# Patient Record
Sex: MALE | Race: WHITE | NOT HISPANIC OR LATINO | Employment: OTHER | URBAN - METROPOLITAN AREA
[De-identification: names, ages, dates, MRNs, and addresses within clinical notes are randomized per-mention and may not be internally consistent; named-entity substitution may affect disease eponyms.]

---

## 2017-01-04 ENCOUNTER — LAB VISIT (OUTPATIENT)
Dept: LAB | Facility: HOSPITAL | Age: 82
End: 2017-01-04
Attending: INTERNAL MEDICINE
Payer: MEDICARE

## 2017-01-04 DIAGNOSIS — M54.9 BACK PAIN, UNSPECIFIED BACK LOCATION, UNSPECIFIED BACK PAIN LATERALITY, UNSPECIFIED CHRONICITY: ICD-10-CM

## 2017-01-04 LAB
ALBUMIN SERPL BCP-MCNC: 3.6 G/DL
ALP SERPL-CCNC: 57 U/L
ALT SERPL W/O P-5'-P-CCNC: 27 U/L
ANION GAP SERPL CALC-SCNC: 6 MMOL/L
AST SERPL-CCNC: 25 U/L
BASOPHILS # BLD AUTO: 0.03 K/UL
BASOPHILS NFR BLD: 0.4 %
BILIRUB SERPL-MCNC: 0.9 MG/DL
BUN SERPL-MCNC: 22 MG/DL
CALCIUM SERPL-MCNC: 9 MG/DL
CHLORIDE SERPL-SCNC: 107 MMOL/L
CO2 SERPL-SCNC: 26 MMOL/L
CREAT SERPL-MCNC: 1 MG/DL
DIFFERENTIAL METHOD: ABNORMAL
EOSINOPHIL # BLD AUTO: 0.1 K/UL
EOSINOPHIL NFR BLD: 1.4 %
ERYTHROCYTE [DISTWIDTH] IN BLOOD BY AUTOMATED COUNT: 14.2 %
EST. GFR  (AFRICAN AMERICAN): >60 ML/MIN/1.73 M^2
EST. GFR  (NON AFRICAN AMERICAN): >60 ML/MIN/1.73 M^2
GLUCOSE SERPL-MCNC: 95 MG/DL
HCT VFR BLD AUTO: 48.4 %
HGB BLD-MCNC: 16.6 G/DL
LYMPHOCYTES # BLD AUTO: 1.6 K/UL
LYMPHOCYTES NFR BLD: 21.9 %
MCH RBC QN AUTO: 30.1 PG
MCHC RBC AUTO-ENTMCNC: 34.3 %
MCV RBC AUTO: 88 FL
MONOCYTES # BLD AUTO: 0.8 K/UL
MONOCYTES NFR BLD: 10.2 %
NEUTROPHILS # BLD AUTO: 4.9 K/UL
NEUTROPHILS NFR BLD: 66.1 %
PLATELET # BLD AUTO: 146 K/UL
PMV BLD AUTO: 10.7 FL
POTASSIUM SERPL-SCNC: 5.1 MMOL/L
PROT SERPL-MCNC: 6.3 G/DL
RBC # BLD AUTO: 5.51 M/UL
SODIUM SERPL-SCNC: 139 MMOL/L
WBC # BLD AUTO: 7.35 K/UL

## 2017-01-04 PROCEDURE — 80053 COMPREHEN METABOLIC PANEL: CPT

## 2017-01-04 PROCEDURE — 85025 COMPLETE CBC W/AUTO DIFF WBC: CPT

## 2017-01-04 PROCEDURE — 36415 COLL VENOUS BLD VENIPUNCTURE: CPT

## 2017-01-09 ENCOUNTER — OFFICE VISIT (OUTPATIENT)
Dept: INTERNAL MEDICINE | Facility: CLINIC | Age: 82
End: 2017-01-09
Payer: MEDICARE

## 2017-01-09 VITALS
BODY MASS INDEX: 27.93 KG/M2 | SYSTOLIC BLOOD PRESSURE: 114 MMHG | DIASTOLIC BLOOD PRESSURE: 73 MMHG | HEIGHT: 67 IN | RESPIRATION RATE: 16 BRPM | HEART RATE: 70 BPM | WEIGHT: 177.94 LBS

## 2017-01-09 DIAGNOSIS — M17.0 PRIMARY OSTEOARTHRITIS OF BOTH KNEES: Primary | ICD-10-CM

## 2017-01-09 DIAGNOSIS — Z96.659 STATUS POST TOTAL KNEE REPLACEMENT, UNSPECIFIED LATERALITY: ICD-10-CM

## 2017-01-09 DIAGNOSIS — N40.0 BENIGN NON-NODULAR PROSTATIC HYPERPLASIA WITHOUT LOWER URINARY TRACT SYMPTOMS: ICD-10-CM

## 2017-01-09 DIAGNOSIS — N20.0 CALCIUM OXALATE RENAL STONES: ICD-10-CM

## 2017-01-09 DIAGNOSIS — K63.5 POLYP OF COLON, UNSPECIFIED PART OF COLON, UNSPECIFIED TYPE: ICD-10-CM

## 2017-01-09 PROCEDURE — 1157F ADVNC CARE PLAN IN RCRD: CPT | Mod: S$GLB,,, | Performed by: INTERNAL MEDICINE

## 2017-01-09 PROCEDURE — 1160F RVW MEDS BY RX/DR IN RCRD: CPT | Mod: S$GLB,,, | Performed by: INTERNAL MEDICINE

## 2017-01-09 PROCEDURE — 99999 PR PBB SHADOW E&M-EST. PATIENT-LVL III: CPT | Mod: PBBFAC,,, | Performed by: INTERNAL MEDICINE

## 2017-01-09 PROCEDURE — 1159F MED LIST DOCD IN RCRD: CPT | Mod: S$GLB,,, | Performed by: INTERNAL MEDICINE

## 2017-01-09 PROCEDURE — 99214 OFFICE O/P EST MOD 30 MIN: CPT | Mod: S$GLB,,, | Performed by: INTERNAL MEDICINE

## 2017-01-09 NOTE — PROGRESS NOTES
He is a 83-year-old gentleman coming in today for a physical exam and   followup of his ongoing medical problems.     He has a history of renal   stones. He had a lithotripsy and renal stents in the distant past. He has seen Urology for this. He has BPH, but can't tolerate flomax. He is having to urinate 2-3 times at night. . He is urinating well the rest of the time. He saw Urology last in June- abdominal XRAY- wnl.     He has had bilateral knee replacement in 2013 and is doing well.    colon polyps- he had a c-scope 4/11-and a repeat in 4/2014-- he had 4 polyps on the last c-scope and is due back in 4/2017. No Gi complaint. No GI complinats       SOCIAL HISTORY: He drinks beer occasionally. He is . He quit   smoking 30 years ago.         ROS : Gen - no fatigue or significant weight change  Eyes - no eye pain or visual changes  ENT - no hoarseness or sore throat.   CV - no CP or SOB  Pulm - no cough or wheezing  GI - no N/V/D   no dysuria or incontinence  MS - as above  Skin - no rash, or c/o of skin lesions  Neuro - no HA, dizziness. memory is good.   Heme - no abnormal bleeding or bruising  Endo - no polydipsia, or temperature changes  Psych - no anxiety or depression         PHYSICAL EXAM: He is a well-appearing 83-year-old gentleman in no acute   distress. wt 177#, bp 114/73, p 64   NECK: Supple. No JVD. Thyroid is not enlarged.   Nodes: No cervical , axillary , or inginual adenopathy.   CHest: His chest is clear and without wheeze.   CARDIOVASCULAR: S1, S2. Regular rate and rhythm without murmur, gallop, or   rub.   ABDOMEN: Soft, nontender. No hepatosplenomegaly. No guarding or rebound   tenderness.   LOWER EXTREMITIES: He has bilateral arthritic changes of the knees. Left   lower extremity has no edema. Right lower extremity, no edema. Dorsal   pedis pulses are normal. Scars haling well.   He has no cervical, axillary, or inguinal adenopathy.   He has bilateral biceps tears and muscle is more  distal- good strength Normal triceps, and patellar deep tendon reflexes.     ASSESSMENT:   1. S/p bilateral knee replacements   2. H/o Renal stones.   3.  BPH-- elevated PSA - follow with urology- PSA pending .   4.History of colon polyps.   5. 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     RECOMMENDATIONS:   1. I recommend low-fat/low-cholesterol diet and exercise.   2. Had Prevnar 13 in 12/2015-- needs tdap today

## 2017-01-09 NOTE — MR AVS SNAPSHOT
[facility]  [address]  [facility] [address]  Phone: [phone]  Fax: [phone]                  Negro Bhakta   2017 9:40 AM   Office Visit    Description:  Male : 9/10/1933   Provider:  Darwin Byrne Jr., MD   Department:  [facility] Medicine           Reason for Visit     Annual Exam           Diagnoses this Visit        Comments    Primary osteoarthritis of both knees    -  Primary     Calcium oxalate renal stones         Benign non-nodular prostatic hyperplasia without lower urinary tract symptoms         Polyp of colon, unspecified part of colon, unspecified type         Status post total knee replacement, unspecified laterality                To Do List           Future Appointments        Provider Department Dept Phone    2/3/2017 3:00 PM Shalom Mcfadden MD [place] - [facility] [phone]      Goals (5 Years of Data)     None      [facility]     [facility] -  Assistance  Registered nurses in the [facility] provide clinical advisement, health education, appointment booking, and other advisory services.  Call for this free service at [phone].             Medications           Message regarding Medications     Verify the changes and/or additions to your medication regime listed below are the same as discussed with your clinician today.  If any of these changes or additions are incorrect, please notify your healthcare provider.             Verify that the below list of medications is an accurate representation of the medications you are currently taking.  If none reported, the list may be blank. If incorrect, please contact your healthcare provider. Carry this list with you in case of emergency.           Current Medications     diclofenac sodium (VOLTAREN) 1 % Gel Apply 2 g topically 4 (four) times daily as needed.    latanoprost 0.005 % ophthalmic solution Place 1 drop into the right eye every  "evening.    multivitamin (ONE DAILY MULTIVITAMIN) per tablet Take 1 tablet by mouth once daily.           Clinical Reference Information           Vital Signs - Last Recorded  Most recent update: 1/9/2017  9:33 AM by Chuck Vazquez MA    BP Pulse Resp Ht Wt BMI    114/73 (BP Location: Right arm, Patient Position: Sitting) 70 16 5' 7" (1.702 m) 80.7 kg (177 lb 14.6 oz) 27.86 kg/m2      Blood Pressure          Most Recent Value    BP  114/73      Allergies as of 1/9/2017     Amoxicillin      Immunizations Administered on Date of Encounter - 1/9/2017     None      "

## 2017-02-03 ENCOUNTER — OFFICE VISIT (OUTPATIENT)
Dept: PAIN MEDICINE | Facility: CLINIC | Age: 82
End: 2017-02-03
Payer: MEDICARE

## 2017-02-03 VITALS
BODY MASS INDEX: 25 KG/M2 | DIASTOLIC BLOOD PRESSURE: 82 MMHG | WEIGHT: 178.56 LBS | RESPIRATION RATE: 16 BRPM | HEIGHT: 71 IN | HEART RATE: 72 BPM | SYSTOLIC BLOOD PRESSURE: 148 MMHG

## 2017-02-03 DIAGNOSIS — M51.36 DDD (DEGENERATIVE DISC DISEASE), LUMBAR: ICD-10-CM

## 2017-02-03 DIAGNOSIS — M75.81 ROTATOR CUFF TENDINITIS, RIGHT: ICD-10-CM

## 2017-02-03 DIAGNOSIS — M19.019 GLENOHUMERAL ARTHRITIS, UNSPECIFIED LATERALITY: Primary | ICD-10-CM

## 2017-02-03 PROCEDURE — 99214 OFFICE O/P EST MOD 30 MIN: CPT | Mod: S$GLB,,, | Performed by: ANESTHESIOLOGY

## 2017-02-03 PROCEDURE — 99999 PR PBB SHADOW E&M-EST. PATIENT-LVL III: CPT | Mod: PBBFAC,,, | Performed by: ANESTHESIOLOGY

## 2017-02-03 PROCEDURE — 1159F MED LIST DOCD IN RCRD: CPT | Mod: S$GLB,,, | Performed by: ANESTHESIOLOGY

## 2017-02-03 PROCEDURE — 1160F RVW MEDS BY RX/DR IN RCRD: CPT | Mod: S$GLB,,, | Performed by: ANESTHESIOLOGY

## 2017-02-03 PROCEDURE — 1157F ADVNC CARE PLAN IN RCRD: CPT | Mod: S$GLB,,, | Performed by: ANESTHESIOLOGY

## 2017-02-03 PROCEDURE — 1125F AMNT PAIN NOTED PAIN PRSNT: CPT | Mod: S$GLB,,, | Performed by: ANESTHESIOLOGY

## 2017-02-03 NOTE — PROGRESS NOTES
Chronic patient Established Note (Follow up visit)      SUBJECTIVE:    Negro Bhakta presents to the clinic for a follow-up appointment for ***. Pt is here for s/p bilateral shoulder intra-articular injection on 12/23/17 with ***  Since the last visit, Negro Bhakta states the pain has been {PAIN - I/W/P:84534}. Current pain intensity is {GEN PAIN SCALE HI:04282}.    Pain Disability Index Review:  No flowsheet data found.    Pain Medications:    {PMC - MEDICATIONS:82600}    Opioid Contract: {YES/NO:63}     report:  {:85398}    Pain Procedures: 12/23/17 bilateral shoulder intra-articular injection     Physical Therapy/Home Exercise: {YES/NO:63}    Imaging: ***    Allergies:   Review of patient's allergies indicates:   Allergen Reactions    Amoxicillin Other (See Comments)     Blisters to hands       Current Medications:   Current Outpatient Prescriptions   Medication Sig Dispense Refill    diclofenac sodium (VOLTAREN) 1 % Gel Apply 2 g topically 4 (four) times daily as needed. 100 g 5    latanoprost 0.005 % ophthalmic solution Place 1 drop into the right eye every evening. 7.5 mL 5    multivitamin (ONE DAILY MULTIVITAMIN) per tablet Take 1 tablet by mouth once daily.      [DISCONTINUED] warfarin (COUMADIN) 2.5 MG tablet Take 1 tablet (2.5 mg total) by mouth Daily. 60 tablet 0     No current facility-administered medications for this visit.        REVIEW OF SYSTEMS:    GENERAL:  No weight loss, malaise or fevers.  HEENT:  Negative for frequent or significant headaches.  NECK:  Negative for lumps, goiter, pain and significant neck swelling.  RESPIRATORY:  Negative for cough, wheezing or shortness of breath.  CARDIOVASCULAR:  Negative for chest pain, leg swelling or palpitations.  GI:  Negative for abdominal discomfort, blood in stools or black stools or change in bowel habits.  MUSCULOSKELETAL:  See HPI.  SKIN:  Negative for lesions, rash, and itching.  PSYCH:  Negative for sleep  disturbance, mood disorder and recent psychosocial stressors.  HEMATOLOGY/LYMPHOLOGY:  Negative for prolonged bleeding, bruising easily or swollen nodes.  NEURO:   No history of headaches, syncope, paralysis, seizures or tremors.  All other reviewed and negative other than HPI.    Past Medical History:  Past Medical History   Diagnosis Date    Allergy     Arthritis     Baker's cyst     BPH (benign prostatic hypertrophy)     Calcium oxalate renal stones     Cataract     Elevated PSA     Glaucoma        Past Surgical History:  Past Surgical History   Procedure Laterality Date    Extracorporeal shock wave lithotripsy      Total knee arthroplasty  7/10/2013     Left    Total knee arthroplasty  3/13/2013     Right    Joint replacement Bilateral     Tonsillectomy      Adenoidectomy         Family History:  Family History   Problem Relation Age of Onset    Cataracts Father     Cancer Sister      Retinal cancer    Esophageal cancer Mother       at 49 years and in the year     Cancer Mother      stomach    No Known Problems Brother     No Known Problems Daughter     No Known Problems Son     No Known Problems Son     No Known Problems Son     No Known Problems Son     No Known Problems Son     No Known Problems Maternal Aunt     No Known Problems Maternal Uncle     No Known Problems Paternal Aunt     No Known Problems Paternal Uncle     No Known Problems Maternal Grandmother     No Known Problems Maternal Grandfather     No Known Problems Paternal Grandmother     No Known Problems Paternal Grandfather     Amblyopia Neg Hx     Blindness Neg Hx     Diabetes Neg Hx     Glaucoma Neg Hx     Hypertension Neg Hx     Macular degeneration Neg Hx     Retinal detachment Neg Hx     Strabismus Neg Hx     Stroke Neg Hx     Thyroid disease Neg Hx        Social History:  Social History     Social History    Marital status:      Spouse name: Delmis    Number of children: N/A     Years of education: N/A     Social History Main Topics    Smoking status: Former Smoker     Quit date: 12/7/1975    Smokeless tobacco: Never Used    Alcohol use 0.6 oz/week     1 Cans of beer per week    Drug use: No    Sexual activity: Yes     Partners: Female     Other Topics Concern    Not on file     Social History Narrative       OBJECTIVE:    There were no vitals taken for this visit.    PHYSICAL EXAMINATION:    General appearance: Well appearing, in no acute distress, alert and oriented x3.  Psych:  Mood and affect appropriate.  Skin: Skin color, texture, turgor normal, no rashes or lesions, in both upper and lower body.  Head/face:  Atraumatic, normocephalic. No palpable lymph nodes  Neck: No pain to palpation over the cervical paraspinous muscles. Spurling Negative. No pain with neck flexion, extension, or lateral flexion. .  Cor: RRR  Pulm: CTA  GI: Abdomen soft and non-tender.  Back: Straight leg raising in the sitting and supine positions is negative to radicular pain. No pain to palpation over the spine or costovertebral angles. Normal range of motion without pain reproduction.  Extremities: Peripheral joint ROM is full and pain free without obvious instability or laxity in all four extremities. No deformities, edema, or skin discoloration. Good capillary refill.  Musculoskeletal: Shoulder, hip, sacroiliac and knee provocative maneuvers are negative. Bilateral upper and lower extremity strength is normal and symmetric.  No atrophy or tone abnormalities are noted.  Neuro: Bilateral upper and lower extremity coordination and muscle stretch reflexes are physiologic and symmetric.  Plantar response are downgoing. No loss of sensation is noted.  Gait: Normal.    ASSESSMENT: 83 y.o. year old male with *** pain, consistent with ***     No diagnosis found.      PLAN:     {PLAN:93954}  - RTC ***  - Counseled patient regarding the importance of {:56278}.    The above plan and management options were  discussed at length with patient. Patient is in agreement with the above and verbalized understanding.    Aurora Florez  02/03/2017

## 2017-02-03 NOTE — PROGRESS NOTES
Chronic patient Established Note (Follow up visit)      SUBJECTIVE:    Negro Bhakta presents to the clinic for a follow-up appointment for shoulder pain bilaterally.     Pain Disability Index Review:  Last 3 PDI Scores 2/3/2017   Pain Disability Index (PDI) 24       Pain Medications:    - Opioids: none   Other - see medication card  Opioid Contract: no     report:  Reviewed and consistent with medication use as prescribed.    Pain Procedures: 12/23/17 bilateral shoulder intra-articular injection with Dr Fely Haney.  Physical Therapy/Home Exercise: yes at home    Imaging:none    Allergies:   Review of patient's allergies indicates:   Allergen Reactions    Amoxicillin Other (See Comments)     Blisters to hands       Current Medications:   Current Outpatient Prescriptions   Medication Sig Dispense Refill    diclofenac sodium (VOLTAREN) 1 % Gel Apply 2 g topically 4 (four) times daily as needed. 100 g 5    latanoprost 0.005 % ophthalmic solution Place 1 drop into the right eye every evening. 7.5 mL 5    multivitamin (ONE DAILY MULTIVITAMIN) per tablet Take 1 tablet by mouth once daily.      [DISCONTINUED] warfarin (COUMADIN) 2.5 MG tablet Take 1 tablet (2.5 mg total) by mouth Daily. 60 tablet 0     No current facility-administered medications for this visit.        REVIEW OF SYSTEMS:    GENERAL:  No weight loss, malaise or fevers.  HEENT:  Negative for frequent or significant headaches.  NECK:  Negative for lumps, goiter, pain and significant neck swelling.  RESPIRATORY:  Negative for cough, wheezing or shortness of breath.  CARDIOVASCULAR:  Negative for chest pain, leg swelling or palpitations.  GI:  Negative for abdominal discomfort, blood in stools or black stools or change in bowel habits.  MUSCULOSKELETAL:  See HPI., + low back and bilateral hand pain   SKIN:  Negative for lesions, rash, and itching.  PSYCH:  Negative for sleep disturbance, mood disorder and recent psychosocial  stressors.  HEMATOLOGY/LYMPHOLOGY:  Negative for prolonged bleeding, bruising easily or swollen nodes.  NEURO:   No history of headaches, syncope, paralysis, seizures or tremors.  All other reviewed and negative other than HPI.    Past Medical History:  Past Medical History   Diagnosis Date    Allergy     Arthritis     Baker's cyst     BPH (benign prostatic hypertrophy)     Calcium oxalate renal stones     Cataract     Elevated PSA     Glaucoma        Past Surgical History:  Past Surgical History   Procedure Laterality Date    Extracorporeal shock wave lithotripsy      Total knee arthroplasty  7/10/2013     Left    Total knee arthroplasty  3/13/2013     Right    Joint replacement Bilateral     Tonsillectomy      Adenoidectomy         Family History:  Family History   Problem Relation Age of Onset    Cataracts Father     Cancer Sister      Retinal cancer    Esophageal cancer Mother       at 49 years and in the year 195    Cancer Mother      stomach    No Known Problems Brother     No Known Problems Daughter     No Known Problems Son     No Known Problems Son     No Known Problems Son     No Known Problems Son     No Known Problems Son     No Known Problems Maternal Aunt     No Known Problems Maternal Uncle     No Known Problems Paternal Aunt     No Known Problems Paternal Uncle     No Known Problems Maternal Grandmother     No Known Problems Maternal Grandfather     No Known Problems Paternal Grandmother     No Known Problems Paternal Grandfather     Amblyopia Neg Hx     Blindness Neg Hx     Diabetes Neg Hx     Glaucoma Neg Hx     Hypertension Neg Hx     Macular degeneration Neg Hx     Retinal detachment Neg Hx     Strabismus Neg Hx     Stroke Neg Hx     Thyroid disease Neg Hx        Social History:  Social History     Social History    Marital status:      Spouse name: Delmis    Number of children: N/A    Years of education: N/A     Social History Main  "Topics    Smoking status: Former Smoker     Quit date: 12/7/1975    Smokeless tobacco: Never Used    Alcohol use 0.6 oz/week     1 Cans of beer per week    Drug use: No    Sexual activity: Yes     Partners: Female     Other Topics Concern    Not on file     Social History Narrative       OBJECTIVE:    Visit Vitals    BP (!) 148/82 (BP Location: Left arm, Patient Position: Sitting, BP Method: Manual)    Pulse 72    Resp 16    Ht 5' 11" (1.803 m)    Wt 81 kg (178 lb 9.2 oz)    BMI 24.91 kg/m2       PHYSICAL EXAMINATION:    General appearance: Well appearing, in no acute distress, alert and oriented x3.  Psych:  Mood and affect appropriate.  Skin: Skin color, texture, turgor normal, no rashes or lesions, in both upper and lower body.  Head/face:  Atraumatic, normocephalic. No palpable lymph nodes  Neck: No pain to palpation over the cervical paraspinous muscles. Spurling Negative. No pain with neck flexion, extension, or lateral flexion. .  Back: Straight leg raising in the sitting and supine positions is negative to radicular pain. No pain to palpation over the spine or costovertebral angles. Normal range of motion without pain reproduction.  Extremities Decrease ROM of the right Shoulder .+ inverted can test on the right  Musculoskeletal: Shoulder, hip, sacroiliac and knee provocative maneuvers are negative. Bilateral upper and lower extremity strength is normal and symmetric.  No atrophy or tone abnormalities are noted.  Neuro: Bilateral upper and lower extremity coordination and muscle stretch reflexes are physiologic and symmetric 2+ in UES 1+ in LEs.  Plantar response are downgoing. No loss of sensation is noted.  Gait: Normal.    ASSESSMENT: 83 y.o. year old male with right > left shoduelr pain ass well as bilaetral hand and lwo back  pain, consistent with GH arthritis rotator cuff tendinitis and lumbar DDD   He is here s/p bilateral shoulder intra-articular injection by  on 12/23/17 with " left is 95% relief and right with 25% relief.He is doing PT and HEP and using Voltaren gel topicaly   He is a new pt  to me    1. Glenohumeral arthritis, unspecified laterality    2. Rotator cuff tendinitis, right    3. DDD (degenerative disc disease), lumbar          PLAN:     - I have stressed the importance of physical activity and a home exercise plan to help with pain and improve health.  -Can repeat right US guided subacromial bursa injection in 2 months  - RTC in 2 months for shoulder injections  - Counseled patient regarding the importance of physical therapy.    The above plan and management options were discussed at length with patient. Patient is in agreement with the above and verbalized understanding.  Shalom Mcfadden MD  2/3/2017

## 2017-04-07 ENCOUNTER — HOSPITAL ENCOUNTER (OUTPATIENT)
Dept: RADIOLOGY | Facility: HOSPITAL | Age: 82
Discharge: HOME | End: 2017-04-07
Attending: ANESTHESIOLOGY
Payer: MEDICARE

## 2017-04-07 ENCOUNTER — PROCEDURE VISIT (OUTPATIENT)
Dept: PAIN MEDICINE | Facility: CLINIC | Age: 82
End: 2017-04-07
Payer: MEDICARE

## 2017-04-07 VITALS
BODY MASS INDEX: 24.61 KG/M2 | SYSTOLIC BLOOD PRESSURE: 124 MMHG | HEIGHT: 71 IN | RESPIRATION RATE: 16 BRPM | DIASTOLIC BLOOD PRESSURE: 74 MMHG | HEART RATE: 68 BPM | WEIGHT: 175.81 LBS

## 2017-04-07 DIAGNOSIS — M75.50 SUBACROMIAL BURSITIS: Primary | ICD-10-CM

## 2017-04-07 DIAGNOSIS — M75.50 SUBACROMIAL BURSITIS: ICD-10-CM

## 2017-04-07 PROCEDURE — 73030 X-RAY EXAM OF SHOULDER: CPT | Mod: 26,50,, | Performed by: RADIOLOGY

## 2017-04-07 PROCEDURE — 73030 X-RAY EXAM OF SHOULDER: CPT | Mod: TC,50

## 2017-04-07 PROCEDURE — 20611 DRAIN/INJ JOINT/BURSA W/US: CPT | Mod: S$GLB,,, | Performed by: ANESTHESIOLOGY

## 2017-04-07 RX ORDER — BUPIVACAINE HYDROCHLORIDE 2.5 MG/ML
5 INJECTION, SOLUTION EPIDURAL; INFILTRATION; INTRACAUDAL ONCE
Status: COMPLETED | OUTPATIENT
Start: 2017-04-07 | End: 2017-04-07

## 2017-04-07 RX ORDER — TRIAMCINOLONE ACETONIDE 40 MG/ML
40 INJECTION, SUSPENSION INTRA-ARTICULAR; INTRAMUSCULAR
Status: COMPLETED | OUTPATIENT
Start: 2017-04-07 | End: 2017-04-07

## 2017-04-07 RX ADMIN — TRIAMCINOLONE ACETONIDE 40 MG: 40 INJECTION, SUSPENSION INTRA-ARTICULAR; INTRAMUSCULAR at 04:04

## 2017-04-07 RX ADMIN — BUPIVACAINE HYDROCHLORIDE 12.5 MG: 2.5 INJECTION, SOLUTION EPIDURAL; INFILTRATION; INTRACAUDAL at 03:04

## 2017-04-07 NOTE — PROCEDURES
Procedures       INFORMED CONSENT: The procedure, risks, benefits and options were discussed with patient. There are no contraindications to the procedure. The patient expressed understanding and agreed to proceed. The personnel performing the procedure was discussed. I verify that I personally obtained Florence's consent prior to the start of the procedure and the signed consent can be found on the patient's chart.    PROCEDURE: LEFT US GUIDED SUBACROMIAL SHOULDER STEROID INJECTION    The patient in the sitting position.The area of the shoulder was prepped with chlorhexidine x 2. A 25 gauge 1.5  inch needle was slowly advanced into the LEFT subacromial  bursa of the left shoulder  Under  US guidance in plane approach .After negative aspiration 6 cc of a mixture of bupivacaine 0.25% and 40 mg Kenalog was easily injected with no resistance. Patient tolerated procedure well. No complications.    Shalom Mcfadden MD  4/7/2017

## 2017-05-02 ENCOUNTER — PATIENT MESSAGE (OUTPATIENT)
Dept: PAIN MEDICINE | Facility: CLINIC | Age: 82
End: 2017-05-02

## 2017-10-16 ENCOUNTER — OFFICE VISIT (OUTPATIENT)
Dept: OPTOMETRY | Facility: CLINIC | Age: 82
End: 2017-10-16
Payer: MEDICARE

## 2017-10-16 DIAGNOSIS — H25.13 NUCLEAR SCLEROSIS, BILATERAL: ICD-10-CM

## 2017-10-16 DIAGNOSIS — H40.053 OCULAR HYPERTENSION, BILATERAL: Primary | ICD-10-CM

## 2017-10-16 DIAGNOSIS — H26.9 CORTICAL CATARACT OF BOTH EYES: ICD-10-CM

## 2017-10-16 DIAGNOSIS — H52.4 PRESBYOPIA OF BOTH EYES: ICD-10-CM

## 2017-10-16 DIAGNOSIS — H52.223 REGULAR ASTIGMATISM OF BOTH EYES: ICD-10-CM

## 2017-10-16 PROCEDURE — 92015 DETERMINE REFRACTIVE STATE: CPT | Mod: S$GLB,,, | Performed by: OPTOMETRIST

## 2017-10-16 PROCEDURE — 99999 PR PBB SHADOW E&M-EST. PATIENT-LVL II: CPT | Mod: PBBFAC,,, | Performed by: OPTOMETRIST

## 2017-10-16 PROCEDURE — 92014 COMPRE OPH EXAM EST PT 1/>: CPT | Mod: S$GLB,,, | Performed by: OPTOMETRIST

## 2017-10-16 NOTE — PATIENT INSTRUCTIONS
Nuclear sclerotic/cortical cataract in both eyes.  No need for cataract surgery in either eye at this time.  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    IOP borderline in each eye today in OD (with Latanoprost 0.005% ophthalmic solution every evening) and in OS (without drops for IOP control).  Continue drops in OD for IOP control, as noted above.   Mrs Levy to call to schedule repeat HVF test (24-2 EUNICE Standard), and Spectralis OCT RNFL thickness analysis, and follow-up visit with me (Dr. Riggins) on the same date.     Regular astigmatism in each eye, with very satisfactory VA in each eye, and presbyopia consistent with age.   New spectacle lens Rx issued for use as desired.

## 2017-10-16 NOTE — PROGRESS NOTES
"HPI     Concerns About Ocular Health    Additional comments: Eye exam - general eye examination and refraction.  No acute ocular/vision problems            Comments   Patient's age: 84 y.o. WM  Occupation: Retired  Approximate date of last eye examination:  08/15/2016  Name of last eye doctor seen: Dr. Riggins  City/State: [facility]  Wears glasses? Yes     If yes, wears  Full-time or part-time?  Full-Time  Present glasses are: Bifocal, SV Distance, SV Reading?  Lined Bifocal  Approximate age of present glasses:  2015+  Got new glasses following last exam, or subsequently?:  No   Any problem with VA with glasses?  No  Wears CLs?:  No  Headaches?  No  Eye pain/discomfort?  No                                                                                     Flashes?  No  Floaters?  No  Diplopia/Double vision?  No  Patient's Ocular History:         Any eye surgeries? No         Any eye injury?  No         Any treatment for eye disease?  Ocular Hypertension  Family history of eye disease?  Sister with eye cancer and enucleation  Significant patient medical history:         1. Diabetes?  No   2. HBP?  No              3. Other (describe):     ! OTC eyedrops currently using:  No   ! Prescription eye meds currently using:  Latanoprost QHS OD Only    ! Any history of allergy/adverse reaction to any eye meds used   previously?  No   ! Any history of allergy/adverse reaction to eyedrops used during prior   eye exam(s)? No   ! Any history of allergy/adverse reaction to Novacaine or similar meds?   No   ! Any history of allergy/adverse reaction to Epinephrine or similar meds?   No    ! Patient okay with use of anesthetic eyedrops to check eye pressure?    Yes       ! Patient okay with use of eyedrops to dilate pupils today?  Yes   !  Allergies/Medications/Medical History/Family History reviewed today?    Yes      PD =   61/57  Desired reading distance =  18"                                                                       " Last edited by Yair Riggins, OD on 10/16/2017 10:59 AM. (History)            Assessment /Plan     For exam results, see Encounter Report.    1. Ocular hypertension, bilateral  Cruz Visual Field - OU - Extended - Both Eyes    Posterior Segment OCT Optic Nerve- Both eyes   2. Nuclear sclerosis, bilateral     3. Cortical cataract of both eyes     4. Regular astigmatism of both eyes     5. Presbyopia of both eyes                  Nuclear sclerotic/cortical cataract in both eyes.  No need for cataract surgery in either eye at this time.  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    IOP borderline in each eye today in OD (with Latanoprost 0.005% ophthalmic solution every evening) and in OS (without drops for IOP control).  Continue drops in OD for IOP control, as noted above.   Mrs Levy to call to schedule repeat HVF test (24-2 EUNICE Standard), and Spectralis OCT RNFL thickness analysis, and follow-up visit with me (Dr. Riggins) on the same date.     Regular astigmatism in each eye, with very satisfactory VA in each eye, and presbyopia consistent with age.   New spectacle lens Rx issued for use as desired.

## 2017-10-25 ENCOUNTER — CLINICAL SUPPORT (OUTPATIENT)
Dept: OPHTHALMOLOGY | Facility: CLINIC | Age: 82
End: 2017-10-25
Attending: OPTOMETRIST
Payer: MEDICARE

## 2017-10-25 ENCOUNTER — OFFICE VISIT (OUTPATIENT)
Dept: OPTOMETRY | Facility: CLINIC | Age: 82
End: 2017-10-25
Attending: OPTOMETRIST
Payer: MEDICARE

## 2017-10-25 DIAGNOSIS — H25.13 NUCLEAR SCLEROSIS, BILATERAL: ICD-10-CM

## 2017-10-25 DIAGNOSIS — H26.9 CORTICAL CATARACT OF BOTH EYES: ICD-10-CM

## 2017-10-25 DIAGNOSIS — H40.053 OCULAR HYPERTENSION, BILATERAL: ICD-10-CM

## 2017-10-25 DIAGNOSIS — H40.053 OCULAR HYPERTENSION, BILATERAL: Primary | ICD-10-CM

## 2017-10-25 DIAGNOSIS — H53.40 VISUAL FIELD DEFECT: ICD-10-CM

## 2017-10-25 PROCEDURE — 92133 CPTRZD OPH DX IMG PST SGM ON: CPT | Mod: S$GLB,,, | Performed by: OPTOMETRIST

## 2017-10-25 PROCEDURE — 92083 EXTENDED VISUAL FIELD XM: CPT | Mod: S$GLB,,, | Performed by: OPTOMETRIST

## 2017-10-25 PROCEDURE — 92012 INTRM OPH EXAM EST PATIENT: CPT | Mod: S$GLB,,, | Performed by: OPTOMETRIST

## 2017-10-25 PROCEDURE — 99999 PR PBB SHADOW E&M-EST. PATIENT-LVL II: CPT | Mod: PBBFAC,,, | Performed by: OPTOMETRIST

## 2017-10-25 NOTE — PATIENT INSTRUCTIONS
Prior diagnosis of ocular hypertension in the right eye.  Currently using Latanoprost 0.005% ophthalmic solution in the right eye (only) every evening for IOP control/reduction.  IOP today high-normal in the right eye (with drops), and borderline in the left eye (without drops).  Repeat HVF test (24-2 EUNICE Standard), and OCT RNFL thickness analysis done today.     OCT RNFL thickness analysis:       OD  Globally reduced - Borderline TI and T and TS - within normal range NS                            and N and NI         OS RNFL thickness within normal range 360 degrees     Reviewed results of repeat HVF test (24-2 EUNICE Standard) done today.  Refer to results in OIS.  Results appear reliable x 3, OD and OS.       OD  GHT Outside Normal Limits             Pattrern Deviation:  Focal superior paracentral visual field defect, and inferior paracentral defects suggestive of early inferior arcuate defect             GPA:  Possible Progression        OS  GHT Within Normal Range              Pattern Deviation:  Normal               GPA:  No Progression Detected    Discussed results with Mr. Bhakta  Recheck in six months, with repeat HVF test.    No change to drops for IOP control made today.  Consider modification of treatment for IOP control/reduction if results of future HVF test consistent with results noted today.     Orders for future HVF test entered.

## 2017-10-26 NOTE — PROGRESS NOTES
HPI     Concerns About Ocular Health    Additional comments: f/u            Comments   Patient in for progress check.    Being followed for (diagnosis):   Ocular Hypertension     Date last seen:  10/16/17    Doctor last seen:  Dr. Riggins     Prescribed eye medications(s) using:  Latanoprost 0.005% OD     OTC eye medication(s) using:  None     Signs/symptoms of condition resolved/better/stable/worse?:  Stable     Allergies/Medications reviewed today?  Yes              Last edited by Rao Levy on 10/25/2017 12:53 PM. (History)            Assessment /Plan     For exam results, see Encounter Report.    1. Ocular hypertension, bilateral  Posterior Segment OCT Optic Nerve- Both eyes    Cruz Visual Field - OU - Extended - Both Eyes    CANCELED: Posterior Segment OCT Retina-Both eyes   2. Visual field defect  Cruz Visual Field - OU - Extended - Both Eyes   3. Nuclear sclerosis, bilateral     4. Cortical cataract of both eyes                      Prior diagnosis of ocular hypertension in the right eye.  Currently using Latanoprost 0.005% ophthalmic solution in the right eye (only) every evening for IOP control/reduction.  IOP today high-normal in the right eye (with drops), and borderline in the left eye (without drops).  Repeat HVF test (24-2 EUNICE Standard), and OCT RNFL thickness analysis done today.     OCT RNFL thickness analysis:       OD  Globally reduced - Borderline TI and T and TS - within normal range NS                            and N and NI         OS RNFL thickness within normal range 360 degrees     Reviewed results of repeat HVF test (24-2 EUNICE Standard) done today.  Refer to results in OIS.  Results appear reliable x 3, OD and OS.       OD  GHT Outside Normal Limits             Pattrern Deviation:  Focal superior paracentral visual field defect, and inferior paracentral defects suggestive of early inferior arcuate defect             GPA:  Possible Progression        OS  GHT Within Normal  Range              Pattern Deviation:  Normal               GPA:  No Progression Detected    Discussed results with Mr. Bhakta  Recheck in six months, with repeat HVF test.    No change to drops for IOP control made today.  Consider modification of treatment for IOP control/reduction if results of future HVF test consistent with results noted today.   Orders for future HVF test entered.

## 2017-11-27 ENCOUNTER — TELEPHONE (OUTPATIENT)
Dept: INTERNAL MEDICINE | Facility: CLINIC | Age: 82
End: 2017-11-27

## 2017-11-27 DIAGNOSIS — N20.0 CALCIUM OXALATE RENAL STONES: Primary | ICD-10-CM

## 2017-11-27 NOTE — TELEPHONE ENCOUNTER
----- Message from Balwinder Ramos sent at 11/27/2017  3:07 PM CST -----  Contact: Patient [phone]  The Annual Physical appointment is set for 2/22/18. Need labs.    Thank you

## 2018-02-05 ENCOUNTER — PES CALL (OUTPATIENT)
Dept: ADMINISTRATIVE | Facility: CLINIC | Age: 83
End: 2018-02-05

## 2018-02-19 ENCOUNTER — LAB VISIT (OUTPATIENT)
Dept: LAB | Facility: HOSPITAL | Age: 83
End: 2018-02-19
Attending: INTERNAL MEDICINE
Payer: MEDICARE

## 2018-02-19 DIAGNOSIS — N20.0 CALCIUM OXALATE RENAL STONES: ICD-10-CM

## 2018-02-19 LAB
ALBUMIN SERPL BCP-MCNC: 3.8 G/DL
ALP SERPL-CCNC: 68 U/L
ALT SERPL W/O P-5'-P-CCNC: 26 U/L
ANION GAP SERPL CALC-SCNC: 6 MMOL/L
AST SERPL-CCNC: 28 U/L
BILIRUB SERPL-MCNC: 0.8 MG/DL
BUN SERPL-MCNC: 25 MG/DL
CALCIUM SERPL-MCNC: 9.6 MG/DL
CHLORIDE SERPL-SCNC: 105 MMOL/L
CO2 SERPL-SCNC: 28 MMOL/L
CREAT SERPL-MCNC: 1.2 MG/DL
EST. GFR  (AFRICAN AMERICAN): >60 ML/MIN/1.73 M^2
EST. GFR  (NON AFRICAN AMERICAN): 55 ML/MIN/1.73 M^2
GLUCOSE SERPL-MCNC: 98 MG/DL
POTASSIUM SERPL-SCNC: 5.2 MMOL/L
PROT SERPL-MCNC: 6.6 G/DL
SODIUM SERPL-SCNC: 139 MMOL/L

## 2018-02-19 PROCEDURE — 80053 COMPREHEN METABOLIC PANEL: CPT

## 2018-02-19 PROCEDURE — 36415 COLL VENOUS BLD VENIPUNCTURE: CPT

## 2018-02-20 ENCOUNTER — TELEPHONE (OUTPATIENT)
Dept: INTERNAL MEDICINE | Facility: CLINIC | Age: 83
End: 2018-02-20

## 2018-02-22 ENCOUNTER — OFFICE VISIT (OUTPATIENT)
Dept: INTERNAL MEDICINE | Facility: CLINIC | Age: 83
End: 2018-02-22
Payer: MEDICARE

## 2018-02-22 VITALS
SYSTOLIC BLOOD PRESSURE: 118 MMHG | DIASTOLIC BLOOD PRESSURE: 60 MMHG | HEIGHT: 71 IN | BODY MASS INDEX: 24.6 KG/M2 | HEART RATE: 60 BPM | WEIGHT: 175.69 LBS

## 2018-02-22 DIAGNOSIS — K63.5 POLYP OF COLON, UNSPECIFIED PART OF COLON, UNSPECIFIED TYPE: ICD-10-CM

## 2018-02-22 DIAGNOSIS — Z96.659 STATUS POST TOTAL KNEE REPLACEMENT, UNSPECIFIED LATERALITY: ICD-10-CM

## 2018-02-22 DIAGNOSIS — N20.0 CALCIUM OXALATE RENAL STONES: Primary | ICD-10-CM

## 2018-02-22 PROCEDURE — 1126F AMNT PAIN NOTED NONE PRSNT: CPT | Mod: S$GLB,,, | Performed by: INTERNAL MEDICINE

## 2018-02-22 PROCEDURE — 3008F BODY MASS INDEX DOCD: CPT | Mod: S$GLB,,, | Performed by: INTERNAL MEDICINE

## 2018-02-22 PROCEDURE — 99213 OFFICE O/P EST LOW 20 MIN: CPT | Mod: S$GLB,,, | Performed by: INTERNAL MEDICINE

## 2018-02-22 PROCEDURE — 99999 PR PBB SHADOW E&M-EST. PATIENT-LVL III: CPT | Mod: PBBFAC,,, | Performed by: INTERNAL MEDICINE

## 2018-02-22 PROCEDURE — 1159F MED LIST DOCD IN RCRD: CPT | Mod: S$GLB,,, | Performed by: INTERNAL MEDICINE

## 2018-02-22 NOTE — PROGRESS NOTES
I have personally taken the history and examined this patient and agree with the resident's note as stated above with the following thoughts:      Doing well-- no complaints.      Colon polyps-- due for cscope-  Will discuss with HIm-- last in 4/2014-- 2 TA's

## 2018-02-22 NOTE — PROGRESS NOTES
Subjective:       Patient ID: Negro Bhakta is a 84 y.o. male.    Chief Complaint: Annual Exam    Mr Bhakta is an 85 yo male with PMHx of renal stones, BPH, bilateral knee replacement and BCC's (followed by biannual visits with dermatology) who presents to primary care clinic for his annual exam. Pt denies any major health issues since his prior visit with the clinic last year. Reports some occasions of shoulder pain for which he had visited pain management, who had administered some steroid injections. These relieved his pain immediately, and he had not had recurrence of the pain since spring time. Denies recurrence of flank pain and endorses adequate hydration in the summer months > winter months. States he has noticed he has not been drinking as much water as he probably should lately. Most recent CMP revealed mildly elevated potassium and BUN:Cr ratio. States he has noticed increase in the number of his skin moles, but he visits with a dermatologist who has excised some BCC's for him. Endorses wearing sunscreen and hats when he is outside. Denies any knee pain or weakness. Does report some fatigue that requires rest. When pressed for more information, he elaborates that, when hunting and fishing for more than 6-8 hours at a time, he has to stop for 10-15 minutes every 2-3 hours d/t fatigue. Denies any shortness of breath or decreased exercise tolerance. Denies any redness or swelling with his joints.     Pt's last colonoscopy in 2014 performed by Dr Boateng shoed three polyps resected and sent for pathology, which revealed two adenomas and a hyperplastic polyp. Recommendation was made to repeat colonoscopy in three years.     Review of systems was entirely negative.         Review of Systems   Constitutional: Negative for chills, fatigue, fever and unexpected weight change.   HENT: Negative for facial swelling, sneezing and sore throat.    Eyes: Negative for pain and visual disturbance.    Respiratory: Negative for cough, shortness of breath and wheezing.    Cardiovascular: Negative for chest pain and leg swelling.   Gastrointestinal: Negative for abdominal pain, diarrhea and vomiting.   Endocrine: Negative for polydipsia and polyuria.   Genitourinary: Negative for decreased urine volume, difficulty urinating, dysuria and urgency.   Musculoskeletal: Negative for arthralgias and myalgias.   Skin: Negative for pallor, rash and wound.   Allergic/Immunologic: Negative for environmental allergies and food allergies.   Neurological: Negative for dizziness, light-headedness and headaches.   Psychiatric/Behavioral: Negative for confusion. The patient is not nervous/anxious.        Objective:      Physical Exam   Constitutional: He is oriented to person, place, and time. He appears well-developed and well-nourished.   HENT:   Head: Normocephalic and atraumatic.   Eyes: Conjunctivae and EOM are normal. Pupils are equal, round, and reactive to light.   Neck: Normal range of motion. Neck supple.   Cardiovascular: Normal rate, regular rhythm, normal heart sounds and intact distal pulses.    No murmur heard.  Pulmonary/Chest: Effort normal and breath sounds normal. No respiratory distress. He has no wheezes.   Abdominal: Soft. Bowel sounds are normal. He exhibits no distension and no mass.   Lymphadenopathy:     He has no cervical adenopathy.   Neurological: He is alert and oriented to person, place, and time.   Skin: Skin is warm and dry.   Vitals reviewed.      Assessment:       1. Calcium oxalate renal stones    2. Polyp of colon, unspecified part of colon, unspecified type    3. Status post total knee replacement, unspecified laterality        Plan:       Negro was seen today for annual exam. Overall, pt is in excellent health and doing very well. Very pleasant individual.     Diagnoses and all orders for this visit:    Calcium oxalate renal stones   - Nil recurrence since cessation of his calcium  supplementation;    - Reviewed mild hyperkalemia and increased BUN:Cr ratio, which may point to dehydration; encouraged increase in water intake as dehydration could lead to recurrence of his renal stones   - Pt endorsed understanding of this.     Polyp of colon, unspecified part of colon, unspecified type  -     Previous colonoscopy performed 2014 revealed tubular adenoma x2 as well as a hyperplastic polyp;  - While pt is 85 yo, he is in good health and without any major health issues currently; most of his family members have made it to their 90's; expect good ten-year survival; recommended C-Scope and he acquiesced.   - Case request GI: COLONOSCOPY    Status post total knee replacement, unspecified laterality   -  Denies any joint pain; reports active lifestyle with weekly hunting and fishing trips with friends and family   - No current issues, will continue to monitor    F/u in nine months. I have discussed the patient's case and findings with the attending, Dr Byrne. Encouraged him to RTC if new issues arise.        Behram Khan, MD  Internal Medicine, PGY-1  #[phone]

## 2018-02-26 DIAGNOSIS — Z12.11 ENCOUNTER FOR COLONOSCOPY DUE TO HISTORY OF COLONIC POLYP: Primary | ICD-10-CM

## 2018-02-26 DIAGNOSIS — Z86.010 ENCOUNTER FOR COLONOSCOPY DUE TO HISTORY OF COLONIC POLYP: Primary | ICD-10-CM

## 2018-02-26 RX ORDER — POLYETHYLENE GLYCOL 3350, SODIUM SULFATE ANHYDROUS, SODIUM BICARBONATE, SODIUM CHLORIDE, POTASSIUM CHLORIDE 236; 22.74; 6.74; 5.86; 2.97 G/4L; G/4L; G/4L; G/4L; G/4L
4 POWDER, FOR SOLUTION ORAL ONCE
Qty: 4000 ML | Refills: 0 | Status: SHIPPED | OUTPATIENT
Start: 2018-02-26 | End: 2018-02-26

## 2018-04-09 ENCOUNTER — ANESTHESIA EVENT (OUTPATIENT)
Dept: ENDOSCOPY | Facility: HOSPITAL | Age: 83
End: 2018-04-09
Payer: MEDICARE

## 2018-04-09 ENCOUNTER — ANESTHESIA (OUTPATIENT)
Dept: ENDOSCOPY | Facility: HOSPITAL | Age: 83
End: 2018-04-09
Payer: MEDICARE

## 2018-04-09 ENCOUNTER — HOSPITAL ENCOUNTER (OUTPATIENT)
Facility: HOSPITAL | Age: 83
Discharge: HOME | End: 2018-04-09
Attending: COLON & RECTAL SURGERY | Admitting: COLON & RECTAL SURGERY
Payer: MEDICARE

## 2018-04-09 ENCOUNTER — SURGERY (OUTPATIENT)
Age: 83
End: 2018-04-09

## 2018-04-09 VITALS
WEIGHT: 170 LBS | RESPIRATION RATE: 16 BRPM | DIASTOLIC BLOOD PRESSURE: 71 MMHG | HEART RATE: 60 BPM | HEIGHT: 71 IN | TEMPERATURE: 99 F | OXYGEN SATURATION: 98 % | BODY MASS INDEX: 23.8 KG/M2 | SYSTOLIC BLOOD PRESSURE: 149 MMHG

## 2018-04-09 DIAGNOSIS — Z12.11 SCREENING FOR COLON CANCER: ICD-10-CM

## 2018-04-09 PROCEDURE — 37000009 HC ANESTHESIA EA ADD 15 MINS: Performed by: COLON & RECTAL SURGERY

## 2018-04-09 PROCEDURE — G0105 COLORECTAL SCRN; HI RISK IND: HCPCS | Mod: ,,, | Performed by: COLON & RECTAL SURGERY

## 2018-04-09 PROCEDURE — D9220A PRA ANESTHESIA: Mod: ANES,,, | Performed by: ANESTHESIOLOGY

## 2018-04-09 PROCEDURE — G0105 COLORECTAL SCRN; HI RISK IND: HCPCS | Performed by: COLON & RECTAL SURGERY

## 2018-04-09 PROCEDURE — D9220A PRA ANESTHESIA: Mod: CRNA,,, | Performed by: NURSE ANESTHETIST, CERTIFIED REGISTERED

## 2018-04-09 PROCEDURE — 63600175 PHARM REV CODE 636 W HCPCS: Performed by: NURSE ANESTHETIST, CERTIFIED REGISTERED

## 2018-04-09 PROCEDURE — 25000003 PHARM REV CODE 250: Performed by: NURSE PRACTITIONER

## 2018-04-09 PROCEDURE — 37000008 HC ANESTHESIA 1ST 15 MINUTES: Performed by: COLON & RECTAL SURGERY

## 2018-04-09 RX ORDER — PROPOFOL 10 MG/ML
VIAL (ML) INTRAVENOUS CONTINUOUS PRN
Status: DISCONTINUED | OUTPATIENT
Start: 2018-04-09 | End: 2018-04-09

## 2018-04-09 RX ORDER — PROPOFOL 10 MG/ML
VIAL (ML) INTRAVENOUS
Status: DISCONTINUED | OUTPATIENT
Start: 2018-04-09 | End: 2018-04-09

## 2018-04-09 RX ORDER — SODIUM CHLORIDE 9 MG/ML
INJECTION, SOLUTION INTRAVENOUS CONTINUOUS
Status: DISCONTINUED | OUTPATIENT
Start: 2018-04-09 | End: 2018-04-09 | Stop reason: HOSPADM

## 2018-04-09 RX ORDER — LIDOCAINE HCL/PF 100 MG/5ML
SYRINGE (ML) INTRAVENOUS
Status: DISCONTINUED | OUTPATIENT
Start: 2018-04-09 | End: 2018-04-09

## 2018-04-09 RX ADMIN — PROPOFOL 100 MCG/KG/MIN: 10 INJECTION, EMULSION INTRAVENOUS at 09:04

## 2018-04-09 RX ADMIN — LIDOCAINE HYDROCHLORIDE 40 MG: 20 INJECTION, SOLUTION INTRAVENOUS at 09:04

## 2018-04-09 RX ADMIN — SODIUM CHLORIDE: 9 INJECTION, SOLUTION INTRAVENOUS at 08:04

## 2018-04-09 RX ADMIN — PROPOFOL 80 MG: 10 INJECTION, EMULSION INTRAVENOUS at 09:04

## 2018-04-09 NOTE — H&P
Endoscopy H&P    Procedure : Colonoscopy      personal history of colon polyps and most recent endoscopic exam 3 years ago (melinda TA)      Past Medical History:   Diagnosis Date    Allergy     Arthritis     Baker's cyst     BPH (benign prostatic hypertrophy)     Calcium oxalate renal stones     Cataract     Elevated PSA     Glaucoma              Review of Systems -ROS:  GENERAL: No fever, chills, fatigability or weight loss.  CHEST: Denies CAMPOS, cyanosis, wheezing, cough and sputum production.  CARDIOVASCULAR: Denies chest pain, PND, orthopnea or reduced exercise tolerance.   Musculoskeletal ROS: negative for - gait disturbance or joint pain  Neurological ROS: negative for - confusion or memory loss        Physical Exam:  General: well developed, well nourished, no distress  Head: normocephalic  Neck: supple, symmetrical, trachea midline  Lungs:  clear to auscultation bilaterally and normal respiratory effort  Heart: regular rate and rhythm, S1, S2 normal, no murmur, rub or gallop and regular rate and rhythm  Abdomen: soft, non-tender non-distented; bowel sounds normal; no masses,  no organomegaly  Extremities: no cyanosis or edema, or clubbing       Moderate Sedation (choice): Mallampati Score 1    ASA : II    IMP: personal history of colon polyps    Plan: Colonoscopy with Moderate sedation.  I have explained the procedure including indications, alternatives, expected outcomes and potential complications. The patient appears to understand and gives informed consent. The patient is medically ready for surgery.

## 2018-04-09 NOTE — ANESTHESIA PREPROCEDURE EVALUATION
04/09/2018     Negro Bhakta is a 84 y.o., male here for colonoscopy.    Past Medical History:   Diagnosis Date    Allergy     Arthritis     Baker's cyst     BPH (benign prostatic hypertrophy)     Calcium oxalate renal stones     Cataract     Elevated PSA     Glaucoma        Past Surgical History:   Procedure Laterality Date    ADENOIDECTOMY      EXTRACORPOREAL SHOCK WAVE LITHOTRIPSY      JOINT REPLACEMENT Bilateral     TONSILLECTOMY      TOTAL KNEE ARTHROPLASTY  7/10/2013    Left    TOTAL KNEE ARTHROPLASTY  3/13/2013    Right         Anesthesia Evaluation    I have reviewed the Patient Summary Reports.     I have reviewed the Medications.     Review of Systems  Anesthesia Hx:  No problems with previous Anesthesia  History of prior surgery of interest to airway management or planning: Denies Family Hx of Anesthesia complications.   Denies Personal Hx of Anesthesia complications.   Cardiovascular:  Cardiovascular Normal     Pulmonary:  Pulmonary Normal    Neurological:  Neurology Normal        Physical Exam  General:  Well nourished    Airway/Jaw/Neck:  Airway Findings: Mouth Opening: Normal Tongue: Normal  General Airway Assessment: Adult  Mallampati: II  TM Distance: Normal, at least 6 cm      Dental:  Dental Findings: In tact   Chest/Lungs:  Chest/Lungs Findings: Normal Respiratory Rate     Heart/Vascular:  Heart Findings: Rate: Normal        Mental Status:  Mental Status Findings:  Alert and Oriented         Anesthesia Plan  Type of Anesthesia, risks & benefits discussed:  Anesthesia Type:  general  Patient's Preference:   Intra-op Monitoring Plan: standard ASA monitors  Intra-op Monitoring Plan Comments:   Post Op Pain Control Plan: multimodal analgesia, IV/PO Opioids PRN and per primary service following discharge from PACU  Post Op Pain Control Plan Comments:   Induction:    IV  Beta Blocker:  Patient is not currently on a Beta-Blocker (No further documentation required).       Informed Consent: Patient understands risks and agrees with Anesthesia plan.  Questions answered. Anesthesia consent signed with patient.  ASA Score: 2     Day of Surgery Review of History & Physical:    H&P update referred to the surgeon.         Ready For Surgery From Anesthesia Perspective.

## 2018-04-09 NOTE — ANESTHESIA POSTPROCEDURE EVALUATION
"Anesthesia Post Evaluation    Patient: Negro Bhakta    Procedure(s) Performed: Procedure(s) (LRB):  COLONOSCOPY (N/A)    Final Anesthesia Type: general  Patient location during evaluation: PACU  Patient participation: Yes- Able to Participate  Level of consciousness: awake and alert  Post-procedure vital signs: reviewed and stable  Pain management: adequate  Airway patency: patent  PONV status at discharge: No PONV  Anesthetic complications: no      Cardiovascular status: blood pressure returned to baseline  Respiratory status: unassisted, room air and spontaneous ventilation  Hydration status: euvolemic  Follow-up not needed.        Visit Vitals  BP (!) 149/71 (BP Location: Left arm, Patient Position: Sitting)   Pulse 60   Temp 37.1 °C (98.8 °F) (Temporal)   Resp 16   Ht 5' 11" (1.803 m)   Wt 77.1 kg (170 lb)   SpO2 98%   BMI 23.71 kg/m²       Pain/Russell Score: Pain Assessment Performed: Yes (4/9/2018 10:25 AM)  Presence of Pain: denies (4/9/2018 10:25 AM)  Russell Score: 10 (4/9/2018 10:10 AM)      "

## 2018-04-09 NOTE — DISCHARGE INSTRUCTIONS
Colonoscopy     A camera attached to a flexible tube with a viewing lens is used to take video pictures.     Colonoscopy is a test to view the inside of your lower digestive tract (colon and rectum). Sometimes it can show the last part of the small intestine (ileum). During the test, small pieces of tissue may be removed for testing. This is called a biopsy. Small growths, such as polyps, may also be removed.   Why is colonoscopy done?  The test is done to help look for colon cancer. And it can help find the source of abdominal pain, bleeding, and changes in bowel habits. It may be needed once a year, depending on factors such as your:  · Age  · Health history  · Family health history  · Symptoms  · Results from any prior colonoscopy  Risks and possible complications  These include:  · Bleeding               · A puncture or tear in the colon   · Risks of anesthesia  · A cancer lesion not being seen  Getting ready   To prepare for the test:  · Talk with your healthcare provider about the risks of the test (see below). Also ask your healthcare provider about alternatives to the test.  · Tell your healthcare provider about any medicines you take. Also tell him or her about any health conditions you may have.  · Make sure your rectum and colon are empty for the test. Follow the diet and bowel prep instructions exactly. If you dont, the test may need to be rescheduled.  · Plan for a friend or family member to drive you home after the test.     Colonoscopy provides an inside view of the entire colon.     You may discuss the results with your doctor right away or at a future visit.  During the test   The test is usually done in the hospital on an outpatient basis. This means you go home the same day. The procedure takes about 30 minutes. During that time:  · You are given relaxing (sedating) medicine through an IV line. You may be drowsy, or fully asleep.  · The healthcare provider will first give you a physical exam to  check for anal and rectal problems.  · Then the anus is lubricated and the scope inserted.  · If you are awake, you may have a feeling similar to needing to have a bowel movement. You may also feel pressure as air is pumped into the colon. Its OK to pass gas during the procedure.  · Biopsy, polyp removal, or other treatments may be done during the test.  After the test   You may have gas right after the test. It can help to try to pass it to help prevent later bloating. Your healthcare provider may discuss the results with you right away. Or you may need to schedule a follow-up visit to talk about the results. After the test, you can go back to your normal eating and other activities. You may be tired from the sedation and need to rest for a few hours.  Date Last Reviewed: 11/1/2016 © 2000-2017 [employer]. [address] [place] [address], [place] [address]. All rights reserved. This information is not intended as a substitute for professional medical care. Always follow your healthcare professional's instructions.

## 2018-04-09 NOTE — TRANSFER OF CARE
"Anesthesia Transfer of Care Note    Patient: Negro Bhakta    Procedure(s) Performed: Procedure(s) (LRB):  COLONOSCOPY (N/A)    Patient location: PACU    Anesthesia Type: general    Transport from OR: Transported from OR on 2-3 L/min O2 by NC with adequate spontaneous ventilation    Post pain: adequate analgesia    Post assessment: no apparent anesthetic complications    Post vital signs: stable    Level of consciousness: awake, alert and oriented    Nausea/Vomiting: no nausea/vomiting    Complications: none    Transfer of care protocol was followed      Last vitals:   Visit Vitals  BP (!) 145/72 (BP Location: Left arm, Patient Position: Lying)   Pulse 62   Temp 36.7 °C (98.1 °F) (Temporal)   Resp 16   Ht 5' 11" (1.803 m)   Wt 77.1 kg (170 lb)   SpO2 98%   BMI 23.71 kg/m²     "

## 2018-04-09 NOTE — PROVATION PATIENT INSTRUCTIONS
Discharge Summary/Instructions after an Endoscopic Procedure  Patient Name: Negro Bhakta  Patient MRN: [id number]  Patient YOB: 1933  Monday, April 09, 2018  Lance Hopper MD  RESTRICTIONS:  During your procedure today, you received medications for sedation.  These   medications may affect your judgment, balance and coordination.  Therefore,   for 24 hours, you have the following restrictions:   - DO NOT drive a car, operate machinery, make legal/financial decisions,   sign important papers or drink alcohol.    ACTIVITY:  The following day: return to full activity including work, except no heavy   lifting, straining or running for 3 days if polyps were removed.  DIET:  Eat and drink normally unless instructed otherwise.     TREATMENT FOR COMMON SIDE EFFECTS:  - Mild abdominal pain, nausea, belching, bloating or excessive gas:  rest,   eat lightly and use a heating pad.  - Sore Throat: treat with throat lozenges and/or gargle with warm salt   water.  - Because air was used during the procedure, expelling large amounts of air   from your rectum or belching is normal.  - If a bowel prep was taken, you may not have a bowel movement for 1-3 days.    This is normal.  SYMPTOMS TO WATCH FOR AND REPORT TO YOUR PHYSICIAN:  1. Abdominal pain or bloating, other than gas cramps.  2. Chest pain.  3. Back pain.  4. Signs of infection such as: chills or fever occurring within 24 hours   after the procedure.  5. Rectal bleeding, which would show as bright red, maroon, or black stools.   (A tablespoon of blood from the rectum is not serious, especially if   hemorrhoids are present.)  6. Vomiting.  7. Weakness or dizziness.  GO DIRECTLY TO THE NEAREST EMERGENCY ROOM IF YOU HAVE ANY OF THE FOLLOWING:      Difficulty breathing              Chills and/or fever over 101 F   Persistent vomiting and/or vomiting blood   Severe abdominal pain   Severe chest pain   Black, tarry stools   Bleeding- more than one tablespoon   Any  other symptom or condition that you feel may need urgent attention  Your doctor recommends these additional instructions:  If any biopsies were taken, your doctors clinic will contact you in 1 to 2   weeks with any results.  - Discharge patient to home (ambulatory).   - Patient has a contact number available for emergencies.  The signs and   symptoms of potential delayed complications were discussed with the   patient.  Return to normal activities tomorrow.  Written discharge   instructions were provided to the patient.   - High fiber diet for the rest of the patient's life.   - Continue present medications.   - Repeat colonoscopy in 5 years for surveillance.  For questions, problems or results please call your physician - Lance Hopper MD at Work:  [phone].  [facility], EMERGENCY ROOM PHONE NUMBER: [phone]  IF A COMPLICATION OR EMERGENCY SITUATION ARISES AND YOU ARE UNABLE TO REACH   YOUR PHYSICIAN - GO DIRECTLY TO THE EMERGENCY ROOM.  Lance Hopper MD  4/9/2018 9:53:56 AM  This report has been verified and signed electronically.

## 2018-04-16 ENCOUNTER — TELEPHONE (OUTPATIENT)
Dept: ENDOSCOPY | Facility: HOSPITAL | Age: 83
End: 2018-04-16

## 2018-05-10 ENCOUNTER — HOSPITAL ENCOUNTER (OUTPATIENT)
Facility: HOSPITAL | Age: 83
Discharge: HOME | End: 2018-05-11
Attending: FAMILY MEDICINE | Admitting: FAMILY MEDICINE
Payer: MEDICARE

## 2018-05-10 DIAGNOSIS — I25.10 CARDIOVASCULAR DISEASE: ICD-10-CM

## 2018-05-10 DIAGNOSIS — K56.609 INTESTINAL OBSTRUCTION: ICD-10-CM

## 2018-05-10 DIAGNOSIS — K56.609 INTESTINAL OBSTRUCTION, UNSPECIFIED CAUSE, UNSPECIFIED WHETHER PARTIAL OR COMPLETE: Primary | ICD-10-CM

## 2018-05-10 DIAGNOSIS — K56.609 SBO (SMALL BOWEL OBSTRUCTION): ICD-10-CM

## 2018-05-10 LAB
ALBUMIN SERPL BCP-MCNC: 3.6 G/DL
ALP SERPL-CCNC: 70 U/L
ALT SERPL W/O P-5'-P-CCNC: 23 U/L
ANION GAP SERPL CALC-SCNC: 6 MMOL/L
AST SERPL-CCNC: 26 U/L
BASOPHILS # BLD AUTO: 0.01 K/UL
BASOPHILS NFR BLD: 0.1 %
BILIRUB SERPL-MCNC: 0.7 MG/DL
BILIRUB UR QL STRIP: NEGATIVE
BUN SERPL-MCNC: 24 MG/DL
CALCIUM SERPL-MCNC: 9.3 MG/DL
CHLORIDE SERPL-SCNC: 106 MMOL/L
CLARITY UR: CLEAR
CO2 SERPL-SCNC: 26 MMOL/L
COLOR UR: YELLOW
CREAT SERPL-MCNC: 1.1 MG/DL
DIFFERENTIAL METHOD: ABNORMAL
EOSINOPHIL # BLD AUTO: 0 K/UL
EOSINOPHIL NFR BLD: 0.1 %
ERYTHROCYTE [DISTWIDTH] IN BLOOD BY AUTOMATED COUNT: 13.9 %
EST. GFR  (AFRICAN AMERICAN): >60 ML/MIN/1.73 M^2
EST. GFR  (NON AFRICAN AMERICAN): >60 ML/MIN/1.73 M^2
GLUCOSE SERPL-MCNC: 128 MG/DL
GLUCOSE UR QL STRIP: NEGATIVE
HCT VFR BLD AUTO: 46.3 %
HGB BLD-MCNC: 15.6 G/DL
HGB UR QL STRIP: ABNORMAL
KETONES UR QL STRIP: ABNORMAL
LEUKOCYTE ESTERASE UR QL STRIP: NEGATIVE
LIPASE SERPL-CCNC: 15 U/L
LYMPHOCYTES # BLD AUTO: 1 K/UL
LYMPHOCYTES NFR BLD: 12.1 %
MCH RBC QN AUTO: 29.7 PG
MCHC RBC AUTO-ENTMCNC: 33.7 G/DL
MCV RBC AUTO: 88 FL
MONOCYTES # BLD AUTO: 0.5 K/UL
MONOCYTES NFR BLD: 6.6 %
NEUTROPHILS # BLD AUTO: 6.4 K/UL
NEUTROPHILS NFR BLD: 81.1 %
NITRITE UR QL STRIP: NEGATIVE
PH UR STRIP: 6 [PH] (ref 5–8)
PLATELET # BLD AUTO: 164 K/UL
PMV BLD AUTO: 10.3 FL
POTASSIUM SERPL-SCNC: 4.9 MMOL/L
PROT SERPL-MCNC: 6.4 G/DL
PROT UR QL STRIP: NEGATIVE
RBC # BLD AUTO: 5.25 M/UL
SODIUM SERPL-SCNC: 138 MMOL/L
SP GR UR STRIP: 1.02 (ref 1–1.03)
URN SPEC COLLECT METH UR: ABNORMAL
UROBILINOGEN UR STRIP-ACNC: NEGATIVE EU/DL
WBC # BLD AUTO: 7.84 K/UL

## 2018-05-10 PROCEDURE — 81003 URINALYSIS AUTO W/O SCOPE: CPT

## 2018-05-10 PROCEDURE — 99219 PR INITIAL OBSERVATION CARE,LEVL II: CPT | Mod: ,,, | Performed by: FAMILY MEDICINE

## 2018-05-10 PROCEDURE — 94760 N-INVAS EAR/PLS OXIMETRY 1: CPT

## 2018-05-10 PROCEDURE — 96376 TX/PRO/DX INJ SAME DRUG ADON: CPT

## 2018-05-10 PROCEDURE — 96361 HYDRATE IV INFUSION ADD-ON: CPT

## 2018-05-10 PROCEDURE — 85025 COMPLETE CBC W/AUTO DIFF WBC: CPT

## 2018-05-10 PROCEDURE — G0378 HOSPITAL OBSERVATION PER HR: HCPCS

## 2018-05-10 PROCEDURE — 63600175 PHARM REV CODE 636 W HCPCS: Performed by: SURGERY

## 2018-05-10 PROCEDURE — 83690 ASSAY OF LIPASE: CPT

## 2018-05-10 PROCEDURE — 36415 COLL VENOUS BLD VENIPUNCTURE: CPT

## 2018-05-10 PROCEDURE — 63600175 PHARM REV CODE 636 W HCPCS: Performed by: FAMILY MEDICINE

## 2018-05-10 PROCEDURE — 96372 THER/PROPH/DIAG INJ SC/IM: CPT | Mod: 59

## 2018-05-10 PROCEDURE — 25000003 PHARM REV CODE 250: Performed by: SURGERY

## 2018-05-10 PROCEDURE — 25000003 PHARM REV CODE 250: Performed by: FAMILY MEDICINE

## 2018-05-10 PROCEDURE — 80053 COMPREHEN METABOLIC PANEL: CPT

## 2018-05-10 PROCEDURE — 99285 EMERGENCY DEPT VISIT HI MDM: CPT | Mod: 25

## 2018-05-10 PROCEDURE — 96374 THER/PROPH/DIAG INJ IV PUSH: CPT

## 2018-05-10 RX ORDER — DEXTROSE MONOHYDRATE, SODIUM CHLORIDE, AND POTASSIUM CHLORIDE 50; 1.49; 4.5 G/1000ML; G/1000ML; G/1000ML
INJECTION, SOLUTION INTRAVENOUS CONTINUOUS
Status: DISCONTINUED | OUTPATIENT
Start: 2018-05-10 | End: 2018-05-11 | Stop reason: HOSPADM

## 2018-05-10 RX ORDER — HYDROMORPHONE HYDROCHLORIDE 1 MG/ML
0.5 INJECTION, SOLUTION INTRAMUSCULAR; INTRAVENOUS; SUBCUTANEOUS
Status: COMPLETED | OUTPATIENT
Start: 2018-05-10 | End: 2018-05-10

## 2018-05-10 RX ORDER — HYDROMORPHONE HYDROCHLORIDE 1 MG/ML
0.5 INJECTION, SOLUTION INTRAMUSCULAR; INTRAVENOUS; SUBCUTANEOUS EVERY 4 HOURS PRN
Status: DISCONTINUED | OUTPATIENT
Start: 2018-05-10 | End: 2018-05-11 | Stop reason: HOSPADM

## 2018-05-10 RX ORDER — SODIUM CHLORIDE AND POTASSIUM CHLORIDE 150; 900 MG/100ML; MG/100ML
1000 INJECTION, SOLUTION INTRAVENOUS
Status: DISCONTINUED | OUTPATIENT
Start: 2018-05-10 | End: 2018-05-10

## 2018-05-10 RX ORDER — SODIUM CHLORIDE 9 MG/ML
1000 INJECTION, SOLUTION INTRAVENOUS
Status: COMPLETED | OUTPATIENT
Start: 2018-05-10 | End: 2018-05-10

## 2018-05-10 RX ORDER — TAMSULOSIN HYDROCHLORIDE 0.4 MG/1
0.4 CAPSULE ORAL DAILY
Status: DISCONTINUED | OUTPATIENT
Start: 2018-05-10 | End: 2018-05-11 | Stop reason: HOSPADM

## 2018-05-10 RX ORDER — PROMETHAZINE HYDROCHLORIDE 25 MG/ML
12.5 INJECTION, SOLUTION INTRAMUSCULAR; INTRAVENOUS
Status: COMPLETED | OUTPATIENT
Start: 2018-05-10 | End: 2018-05-10

## 2018-05-10 RX ORDER — HYDROMORPHONE HYDROCHLORIDE 1 MG/ML
1 INJECTION, SOLUTION INTRAMUSCULAR; INTRAVENOUS; SUBCUTANEOUS EVERY 6 HOURS PRN
Status: DISCONTINUED | OUTPATIENT
Start: 2018-05-10 | End: 2018-05-11 | Stop reason: HOSPADM

## 2018-05-10 RX ORDER — ONDANSETRON 2 MG/ML
4 INJECTION INTRAMUSCULAR; INTRAVENOUS EVERY 8 HOURS PRN
Status: DISCONTINUED | OUTPATIENT
Start: 2018-05-10 | End: 2018-05-11 | Stop reason: HOSPADM

## 2018-05-10 RX ORDER — KETOROLAC TROMETHAMINE 30 MG/ML
30 INJECTION, SOLUTION INTRAMUSCULAR; INTRAVENOUS
Status: COMPLETED | OUTPATIENT
Start: 2018-05-10 | End: 2018-05-10

## 2018-05-10 RX ADMIN — DEXTROSE MONOHYDRATE, SODIUM CHLORIDE, AND POTASSIUM CHLORIDE: 50; 4.5; 1.49 INJECTION, SOLUTION INTRAVENOUS at 04:05

## 2018-05-10 RX ADMIN — PROMETHAZINE HYDROCHLORIDE 12.5 MG: 25 INJECTION INTRAMUSCULAR; INTRAVENOUS at 10:05

## 2018-05-10 RX ADMIN — TAMSULOSIN HYDROCHLORIDE 0.4 MG: 0.4 CAPSULE ORAL at 04:05

## 2018-05-10 RX ADMIN — HYDROMORPHONE HYDROCHLORIDE 0.5 MG: 1 INJECTION, SOLUTION INTRAMUSCULAR; INTRAVENOUS; SUBCUTANEOUS at 11:05

## 2018-05-10 RX ADMIN — DEXTROSE MONOHYDRATE, SODIUM CHLORIDE, AND POTASSIUM CHLORIDE: 50; 4.5; 1.49 INJECTION, SOLUTION INTRAVENOUS at 11:05

## 2018-05-10 RX ADMIN — HYDROMORPHONE HYDROCHLORIDE 1 MG: 1 INJECTION, SOLUTION INTRAMUSCULAR; INTRAVENOUS; SUBCUTANEOUS at 04:05

## 2018-05-10 RX ADMIN — KETOROLAC TROMETHAMINE 30 MG: 30 INJECTION, SOLUTION INTRAMUSCULAR at 10:05

## 2018-05-10 RX ADMIN — SODIUM CHLORIDE 1000 ML: 0.9 INJECTION, SOLUTION INTRAVENOUS at 11:05

## 2018-05-10 NOTE — ED NOTES
Pt given urine speciman cup, marika soap towelettewipe, and instructions for MSCC; understanding verbalized, Bed placed in low position, side rails up x 2, call light is within reach of patient/family, explanation of care provided to family and patient, plan of care: family to bedside, observe and reassure, position of comfort, respirations even and unlabored, awaiting additional orders, will continue to monitor. Pt changed into gown.  Personal belongings placed in belongings bag and given to patient/family member.

## 2018-05-10 NOTE — ASSESSMENT & PLAN NOTE
NPO.  Maint fluids.  Pain meds/antiemetics.  No NG tube.  Surgery consult.  KUB in AM.  No hx of surgery nor abdominal trauma.

## 2018-05-10 NOTE — PLAN OF CARE
05/10/18 1115   Medicare Message   Important Message from Medicare regarding Discharge Appeal Rights Given to patient/caregiver;Explained to patient/caregiver;Signed/date by patient/caregiver   Date IMM was signed 05/10/18   Time IMM was signed 1110

## 2018-05-10 NOTE — HPI
84 year old male with no pmh who sees a PCP in [place] comes in with c/o acute onset of left upper quadrant pain. He says that at 2:00 this morning, he was awakened by sharp cramping abdominal pain. This 'felt like a virus' and he was nauseated, but he did not have any vomiting or diarrhea. He had a normal BM afterwards, but his pain persisted and he came in. He has known h/o nephrolithiasis but says this pain didn't feel like his kidney stones.

## 2018-05-10 NOTE — H&P
HISTORY OF PRESENT ILLNESS:  The patient is admitted to the Emergency Room,   being admitted to the hospital with Dr. Lorenz for left upper quadrant pain.    The patient said it woke him up around 2:00 in the morning.  He had no previous   pain like this.  He had kidney stones in the past which has been more in the   flank and was a different type of pain.  His pain is a little better now.  He   has been given some pain medications.  He has had some nausea, but no vomiting.    No diarrhea.  No constipation.  He had a small bowel movement this morning   without blood or problems.  A CT scan of the abdomen showed possible early   small-bowel obstruction, small 2 mm kidney stone in the left ureter, but no   obstruction.  I was consulted for possible small bowel obstruction.    PAST MEDICAL HISTORY:  Pertinent for a Baker cyst, BPH, cataracts, glaucoma.    REVIEW OF SYSTEMS  CONSTITUTIONAL:  He has had no fever, no chills, no weight loss.  CHEST:  No shortness of breath.  No wheezing or cough.  CARDIOVASCULAR:  No chest pains or irregular heart rates.  MUSCULOSKELETAL:  No joint pains.  No changes.  NEUROLOGICAL:  No memory loss or confusion.    PAST SURGICAL HISTORY:  Includes no abdominal surgery.    FAMILY HISTORY:  Noncontributory.    PHYSICAL EXAMINATION:  GENERAL:  Awake, alert, oriented, in no apparent distress.  VITAL SIGNS:  Stable within normal limits.  HEENT:  Shows no signs of acute infection or inflammation.  Sclerae are   nonicteric.  NECK:  Soft.  No masses.  LUNGS:  Good inspiration and expiration.  No rales or crackles.  HEART:  Regular rate and rhythm.  No murmurs.  ABDOMEN:  Soft, minimally tender in the left upper quadrant with little   guarding, no rebound.  No palpable masses.  No obvious hernias noted in the   abdomen or  area.  EXTREMITIES:  Normal.    The patient had a colonoscopy one month ago, which was completely normal for   history of past polyps.  His CBC is normal with 7.8, hematocrit  is 46, platelet   count is normal.  His urinalysis showed a little blood in the urine and trace   ketones.  Lipase was normal.  His potassium is little high at 5.2, BUN 25,   otherwise normal.  Again, CT scan showed a possible early bowel obstruction with   some dilated loops of small bowel in the left upper quadrant, but there is air   in the colon.  There is also a 2-mm stone in the left ureter, but   nonobstructing.    IMPRESSION:  Left upper quadrant abdominal pain, uncertain etiology, possible   partial small-bowel obstruction.  Also possible ileus secondary to kidney stone   on the left.  Dr. Lorenz is admitting him, given fluids, keep him n.p.o. and   hopefully his pain will improve without any surgical therapy.  I will check on   him again tomorrow to see how he is doing and to see whether he needs to be   observed.  If he improves, then he can also be discharged.  We will follow along   with you.      BGL/HN  dd: 05/10/2018 12:08:44 (CDT)  td: 05/10/2018 13:08:55 (CDT)  Doc ID   #[id number]  Job ID #[id number]    CC:

## 2018-05-10 NOTE — ED NOTES
House Supervisor notified of pt's admit order.  Pt educated, verbalizes understanding.  Cont to monitor

## 2018-05-10 NOTE — PLAN OF CARE
05/10/18 1335   Discharge Assessment   Assessment Type Discharge Planning Assessment   Confirmed/corrected address and phone number on facesheet? Yes   Assessment information obtained from? Patient   Expected Length of Stay (days) 2   Communicated expected length of stay with patient/caregiver yes   Prior to hospitilization cognitive status: Alert/Oriented   Prior to hospitalization functional status: Independent   Current cognitive status: Alert/Oriented   Current Functional Status: Independent   Lives With significant other   Able to Return to Prior Arrangements yes   Is patient able to care for self after discharge? Yes   Who are your caregiver(s) and their phone number(s)? Emely Bhakta [phone]   Patient's perception of discharge disposition home or selfcare   Readmission Within The Last 30 Days no previous admission in last 30 days   Patient currently being followed by outpatient case management? No   Patient currently receives any other outside agency services? No   Equipment Currently Used at Home none   Do you have any problems affording any of your prescribed medications? No   Is the patient taking medications as prescribed? yes   Does the patient have transportation home? Yes   Transportation Available family or friend will provide   Does the patient receive services at the [facility]? No   Discharge Plan A Home with family   Discharge Plan B Home with family;Home Health   Patient/Family In Agreement With Plan yes     The pt is a 84 year old male who was admitted with cardiovascular disease. The pt lives with his spouse. The pt does not use an assistive device to ambulate. The pt does not use O2. The pt does not have hh. The pt is able to afford his medications. The pt has no other SW needs noted at this time. SW will continue to follow and offer support as needed.    Luly Howe LMSW

## 2018-05-10 NOTE — NURSING TRANSFER
Nursing Transfer Note      5/10/2018     Transfer To: 304    Transfer via wheelchair    Transported by Aliya, RN    Chart send with patient: Yes    Patient reassessed at: 5/10/18 at 1230    Upon arrival to floor: cardiac monitor applied, patient & family oriented to room, call bell in reach and bed in lowest position. Vitals stable. No c/o or concerns at this time.

## 2018-05-10 NOTE — H&P
[facility] Hospital Medicine  History & Physical    Patient Name: Negro Bhakta  MRN: [id number]  Admission Date: 5/10/2018  Attending Physician: Rubi Lorenz MD   Primary Care Provider: Darwin Byrne MD         Patient information was obtained from patient and ER records.     Subjective:     Principal Problem:SBO (small bowel obstruction)    Chief Complaint:   Chief Complaint   Patient presents with    Abdominal Pain     LLQ    Flank Pain     left        HPI: 84 year old male with no pmh who sees a PCP in [place] comes in with c/o acute onset of left upper quadrant pain. He says that at 2:00 this morning, he was awakened by sharp cramping abdominal pain. This 'felt like a virus' and he was nauseated, but he did not have any vomiting or diarrhea. He had a normal BM afterwards, but his pain persisted and he came in. He has known h/o nephrolithiasis but says this pain didn't feel like his kidney stones.    Past Medical History:   Diagnosis Date    Allergy     Arthritis     Baker's cyst     BPH (benign prostatic hypertrophy)     Calcium oxalate renal stones     Cataract     Elevated PSA     Glaucoma        Past Surgical History:   Procedure Laterality Date    ADENOIDECTOMY      COLONOSCOPY N/A 4/9/2018    Procedure: COLONOSCOPY;  Surgeon: JAIRO Hopper MD;  Location: [facility] ([address] [place] [address]);  Service: Endoscopy;  Laterality: N/A;    EXTRACORPOREAL SHOCK WAVE LITHOTRIPSY      JOINT REPLACEMENT Bilateral     TONSILLECTOMY      TOTAL KNEE ARTHROPLASTY  7/10/2013    Left    TOTAL KNEE ARTHROPLASTY  3/13/2013    Right       Review of patient's allergies indicates:   Allergen Reactions    Amoxicillin Other (See Comments)     Blisters to hands       No current facility-administered medications on file prior to encounter.      Current Outpatient Prescriptions on File Prior to Encounter   Medication Sig    diclofenac sodium (VOLTAREN) 1 % Gel Apply 2 g topically 4 (four)  times daily as needed.    latanoprost 0.005 % ophthalmic solution Place 1 drop into the right eye every evening.    multivitamin (ONE DAILY MULTIVITAMIN) per tablet Take 1 tablet by mouth once daily.    [DISCONTINUED] warfarin (COUMADIN) 2.5 MG tablet Take 1 tablet (2.5 mg total) by mouth Daily.     Family History     Problem Relation (Age of Onset)    Cancer Sister, Mother    Cataracts Father    Esophageal cancer Mother    No Known Problems Brother, Daughter, Son, Son, Son, Son, Son, Maternal Aunt, Maternal Uncle, Paternal Aunt, Paternal Uncle, Maternal Grandmother, Maternal Grandfather, Paternal Grandmother, Paternal Grandfather        Social History Main Topics    Smoking status: Former Smoker     Quit date: 12/7/1975    Smokeless tobacco: Never Used    Alcohol use 0.6 oz/week     1 Cans of beer per week    Drug use: No    Sexual activity: Yes     Partners: Female     Review of Systems   Constitutional: Negative for chills and fever.   HENT: Negative for congestion, ear pain, postnasal drip, rhinorrhea, sore throat and trouble swallowing.    Eyes: Negative for redness and itching.   Respiratory: Negative for cough, shortness of breath and wheezing.    Cardiovascular: Negative for chest pain and palpitations.   Gastrointestinal: Positive for abdominal pain and nausea. Negative for diarrhea and vomiting.   Genitourinary: Negative for dysuria and frequency.   Skin: Negative for rash.   Neurological: Negative for weakness and headaches.     Objective:     Vital Signs (Most Recent):  Temp: 97.5 °F (36.4 °C) (05/10/18 1526)  Pulse: 63 (05/10/18 1526)  Resp: 18 (05/10/18 1224)  BP: (!) 188/87 (05/10/18 1526)  SpO2: (!) 93 % (05/10/18 1526) Vital Signs (24h Range):  Temp:  [96 °F (35.6 °C)-98 °F (36.7 °C)] 97.5 °F (36.4 °C)  Pulse:  [61-74] 63  Resp:  [18-20] 18  SpO2:  [93 %-98 %] 93 %  BP: (147-188)/(71-87) 188/87     Weight: 77.1 kg (170 lb)  Body mass index is 23.71 kg/m².    Physical Exam   Constitutional:  He is oriented to person, place, and time. He appears well-developed. No distress.   HENT:   Head: Normocephalic and atraumatic.   Eyes: Conjunctivae are normal. Pupils are equal, round, and reactive to light.   Neck: Normal range of motion. Neck supple. No thyromegaly present.   Cardiovascular: Normal rate, regular rhythm, normal heart sounds and intact distal pulses.    Pulmonary/Chest: Effort normal and breath sounds normal. No respiratory distress. He has no wheezes.   Abdominal: Soft. There is tenderness. There is no rebound and no guarding.   Decreased BS.  Tenderness to LUQ with + bowels palpated on exam   Musculoskeletal: Normal range of motion. He exhibits no edema.   Lymphadenopathy:     He has no cervical adenopathy.   Neurological: He is alert and oriented to person, place, and time.   Skin: Skin is warm and dry. No rash noted.   Psychiatric: He has a normal mood and affect. His behavior is normal.   Nursing note and vitals reviewed.        CRANIAL NERVES     CN III, IV, VI   Pupils are equal, round, and reactive to light.       Significant Labs:   CBC:   Recent Labs  Lab 05/10/18  0909   WBC 7.84   HGB 15.6   HCT 46.3        CMP:   Recent Labs  Lab 05/10/18  0909      K 4.9      CO2 26   *   BUN 24*   CREATININE 1.1   CALCIUM 9.3   PROT 6.4   ALBUMIN 3.6   BILITOT 0.7   ALKPHOS 70   AST 26   ALT 23   ANIONGAP 6*   EGFRNONAA >60       Significant Imaging: I have reviewed all pertinent imaging results/findings within the past 24 hours.     1. Multiple left renal cysts.  Renal calcifications without evidence of obstructive uropathy.  2. Bowel stasis versus partial small bowel obstruction primarily within the left upper and mid abdomen affecting the jejunum.  Clinical correlation and follow-up recommended.  3. Additional nonurgent findings as noted above.  *Small bilatearl fat containing inguinal hernias  *Constipatioin  *Spondylolysis  *Compression fracture L3  *Prostatic  hypertrophy    Last C-scope recent (within a month) - normal  - The entire examined colon is normal. No specimens                         collected.                        - The examined portion of the ileum was normal.    Assessment/Plan:     * SBO (small bowel obstruction)    NPO.  Maint fluids.  Pain meds/antiemetics.  No NG tube.  Surgery consult.  KUB in AM.  No hx of surgery nor abdominal trauma.          Calcium oxalate renal stones    Flomax, fluids, pain meds.            VTE Risk Mitigation         Ordered     IP VTE HIGH RISK PATIENT  Once      05/10/18 1155     Place BRADLEY hose  Until discontinued      05/10/18 1155     Place BRADLEY hose  Until discontinued      05/10/18 1155             Rubi Lorenz MD  Department of Hospital Medicine   [facility]

## 2018-05-10 NOTE — PLAN OF CARE
Problem: Patient Care Overview  Goal: Plan of Care Review  Outcome: Ongoing (interventions implemented as appropriate)  Pt & spouse aware of plan of care. No questions or concerns at this time.

## 2018-05-10 NOTE — PLAN OF CARE
05/10/18 1330   PALENCIA Message   Medicare Outpatient and Observation Notification regarding financial responsibility Given to patient/caregiver;Explained to patient/caregiver;Signed/date by patient/caregiver   Date PALENCIA was signed 05/10/18   Time PALENCIA was signed 1332

## 2018-05-10 NOTE — SUBJECTIVE & OBJECTIVE
Past Medical History:   Diagnosis Date    Allergy     Arthritis     Baker's cyst     BPH (benign prostatic hypertrophy)     Calcium oxalate renal stones     Cataract     Elevated PSA     Glaucoma        Past Surgical History:   Procedure Laterality Date    ADENOIDECTOMY      COLONOSCOPY N/A 4/9/2018    Procedure: COLONOSCOPY;  Surgeon: JAIRO Hopper MD;  Location: [address]);  Service: Endoscopy;  Laterality: N/A;    EXTRACORPOREAL SHOCK WAVE LITHOTRIPSY      JOINT REPLACEMENT Bilateral     TONSILLECTOMY      TOTAL KNEE ARTHROPLASTY  7/10/2013    Left    TOTAL KNEE ARTHROPLASTY  3/13/2013    Right       Review of patient's allergies indicates:   Allergen Reactions    Amoxicillin Other (See Comments)     Blisters to hands       No current facility-administered medications on file prior to encounter.      Current Outpatient Prescriptions on File Prior to Encounter   Medication Sig    diclofenac sodium (VOLTAREN) 1 % Gel Apply 2 g topically 4 (four) times daily as needed.    latanoprost 0.005 % ophthalmic solution Place 1 drop into the right eye every evening.    multivitamin (ONE DAILY MULTIVITAMIN) per tablet Take 1 tablet by mouth once daily.    [DISCONTINUED] warfarin (COUMADIN) 2.5 MG tablet Take 1 tablet (2.5 mg total) by mouth Daily.     Family History     Problem Relation (Age of Onset)    Cancer Sister, Mother    Cataracts Father    Esophageal cancer Mother    No Known Problems Brother, Daughter, Son, Son, Son, Son, Son, Maternal Aunt, Maternal Uncle, Paternal Aunt, Paternal Uncle, Maternal Grandmother, Maternal Grandfather, Paternal Grandmother, Paternal Grandfather        Social History Main Topics    Smoking status: Former Smoker     Quit date: 12/7/1975    Smokeless tobacco: Never Used    Alcohol use 0.6 oz/week     1 Cans of beer per week    Drug use: No    Sexual activity: Yes     Partners: Female     Review of Systems   Constitutional: Negative for chills and fever.    HENT: Negative for congestion, ear pain, postnasal drip, rhinorrhea, sore throat and trouble swallowing.    Eyes: Negative for redness and itching.   Respiratory: Negative for cough, shortness of breath and wheezing.    Cardiovascular: Negative for chest pain and palpitations.   Gastrointestinal: Positive for abdominal pain and nausea. Negative for diarrhea and vomiting.   Genitourinary: Negative for dysuria and frequency.   Skin: Negative for rash.   Neurological: Negative for weakness and headaches.     Objective:     Vital Signs (Most Recent):  Temp: 97.5 °F (36.4 °C) (05/10/18 1526)  Pulse: 63 (05/10/18 1526)  Resp: 18 (05/10/18 1224)  BP: (!) 188/87 (05/10/18 1526)  SpO2: (!) 93 % (05/10/18 1526) Vital Signs (24h Range):  Temp:  [96 °F (35.6 °C)-98 °F (36.7 °C)] 97.5 °F (36.4 °C)  Pulse:  [61-74] 63  Resp:  [18-20] 18  SpO2:  [93 %-98 %] 93 %  BP: (147-188)/(71-87) 188/87     Weight: 77.1 kg (170 lb)  Body mass index is 23.71 kg/m².    Physical Exam   Constitutional: He is oriented to person, place, and time. He appears well-developed. No distress.   HENT:   Head: Normocephalic and atraumatic.   Eyes: Conjunctivae are normal. Pupils are equal, round, and reactive to light.   Neck: Normal range of motion. Neck supple. No thyromegaly present.   Cardiovascular: Normal rate, regular rhythm, normal heart sounds and intact distal pulses.    Pulmonary/Chest: Effort normal and breath sounds normal. No respiratory distress. He has no wheezes.   Abdominal: Soft. There is tenderness. There is no rebound and no guarding.   Decreased BS.  Tenderness to LUQ with + bowels palpated on exam   Musculoskeletal: Normal range of motion. He exhibits no edema.   Lymphadenopathy:     He has no cervical adenopathy.   Neurological: He is alert and oriented to person, place, and time.   Skin: Skin is warm and dry. No rash noted.   Psychiatric: He has a normal mood and affect. His behavior is normal.   Nursing note and vitals  reviewed.        CRANIAL NERVES     CN III, IV, VI   Pupils are equal, round, and reactive to light.       Significant Labs:   CBC:   Recent Labs  Lab 05/10/18  0909   WBC 7.84   HGB 15.6   HCT 46.3        CMP:   Recent Labs  Lab 05/10/18  0909      K 4.9      CO2 26   *   BUN 24*   CREATININE 1.1   CALCIUM 9.3   PROT 6.4   ALBUMIN 3.6   BILITOT 0.7   ALKPHOS 70   AST 26   ALT 23   ANIONGAP 6*   EGFRNONAA >60       Significant Imaging: I have reviewed all pertinent imaging results/findings within the past 24 hours.     1. Multiple left renal cysts.  Renal calcifications without evidence of obstructive uropathy.  2. Bowel stasis versus partial small bowel obstruction primarily within the left upper and mid abdomen affecting the jejunum.  Clinical correlation and follow-up recommended.  3. Additional nonurgent findings as noted above.  *Small bilatearl fat containing inguinal hernias  *Constipatioin  *Spondylolysis  *Compression fracture L3  *Prostatic hypertrophy    Last C-scope recent (within a month) - normal  - The entire examined colon is normal. No specimens                         collected.                        - The examined portion of the ileum was normal.

## 2018-05-10 NOTE — ED PROVIDER NOTES
Encounter Date: 5/10/2018       History     Chief Complaint   Patient presents with    Abdominal Pain     LLQ    Flank Pain     left     Patient is an 84-year-old white male in good health, who had abrupt onset of left lower quadrant and left flank pain this morning earlier.  No nausea and vomiting.  No diarrhea.  He has a history of right sided kidney stones in the past, this pain may be similar to what he has previously experienced.  He has no trouble urinating at this time.          Review of patient's allergies indicates:   Allergen Reactions    Amoxicillin Other (See Comments)     Blisters to hands     Past Medical History:   Diagnosis Date    Allergy     Arthritis     Baker's cyst     BPH (benign prostatic hypertrophy)     Calcium oxalate renal stones     Cataract     Elevated PSA     Glaucoma      Past Surgical History:   Procedure Laterality Date    ADENOIDECTOMY      COLONOSCOPY N/A 2018    Procedure: COLONOSCOPY;  Surgeon: JAIRO Hopper MD;  [facility] [place] [address]);  Service: Endoscopy;  Laterality: N/A;    EXTRACORPOREAL SHOCK WAVE LITHOTRIPSY      JOINT REPLACEMENT Bilateral     TONSILLECTOMY      TOTAL KNEE ARTHROPLASTY  7/10/2013    Left    TOTAL KNEE ARTHROPLASTY  3/13/2013    Right     Family History   Problem Relation Age of Onset    Cataracts Father     Cancer Sister         Retinal cancer    Esophageal cancer Mother          at 49 years and in the year 195    Cancer Mother         stomach    No Known Problems Brother     No Known Problems Daughter     No Known Problems Son     No Known Problems Son     No Known Problems Son     No Known Problems Son     No Known Problems Son     No Known Problems Maternal Aunt     No Known Problems Maternal Uncle     No Known Problems Paternal Aunt     No Known Problems Paternal Uncle     No Known Problems Maternal Grandmother     No Known Problems Maternal Grandfather     No Known Problems Paternal Grandmother      No Known Problems Paternal Grandfather     Amblyopia Neg Hx     Blindness Neg Hx     Diabetes Neg Hx     Glaucoma Neg Hx     Hypertension Neg Hx     Macular degeneration Neg Hx     Retinal detachment Neg Hx     Strabismus Neg Hx     Stroke Neg Hx     Thyroid disease Neg Hx      Social History   Substance Use Topics    Smoking status: Former Smoker     Quit date: 12/7/1975    Smokeless tobacco: Never Used    Alcohol use 0.6 oz/week     1 Cans of beer per week     Review of Systems   Constitutional: Negative for chills and fever.   HENT: Negative.    Eyes: Negative.    Respiratory: Negative.    Gastrointestinal: Positive for abdominal pain.   Genitourinary: Negative.        Physical Exam     Initial Vitals [05/10/18 0831]   BP Pulse Resp Temp SpO2   (!) 169/86 74 20 96 °F (35.6 °C) 98 %      MAP       113.67         Physical Exam    Constitutional: He appears well-developed and well-nourished.   HENT:   Head: Normocephalic and atraumatic.   Eyes: EOM are normal. Pupils are equal, round, and reactive to light.   Neck: Normal range of motion. Neck supple.   Cardiovascular: Normal rate.   Pulmonary/Chest: Breath sounds normal.   Abdominal: There is tenderness. There is no rebound and no guarding.   Tender to palpation LLQ.   Musculoskeletal: Normal range of motion.   Neurological: He is alert and oriented to person, place, and time.   Skin: Skin is warm and dry.   Psychiatric: He has a normal mood and affect.         ED Course   Procedures  Labs Reviewed   URINALYSIS - Abnormal; Notable for the following:        Result Value    Ketones, UA Trace (*)     Occult Blood UA Trace (*)     All other components within normal limits       CT scan of the abdomen is interpreted as ileus versus possible partial small bowel obstruction in the left upper quadrant.  I discussed admission for observation with Dr. Lorenz, with consultation with general surgery, and Dr. Lorenz and the patient are agreeable to this.                             Clinical Impression:   The encounter diagnosis was Intestinal obstruction, unspecified cause, unspecified whether partial or complete.    Disposition:   Disposition: Placed in Observation  Condition: Stable                        Refugio Burris Jr., MD  05/10/18 6759

## 2018-05-11 VITALS
DIASTOLIC BLOOD PRESSURE: 70 MMHG | RESPIRATION RATE: 17 BRPM | HEIGHT: 71 IN | WEIGHT: 170 LBS | BODY MASS INDEX: 23.8 KG/M2 | SYSTOLIC BLOOD PRESSURE: 133 MMHG | OXYGEN SATURATION: 96 % | TEMPERATURE: 98 F | HEART RATE: 66 BPM

## 2018-05-11 LAB
ALBUMIN SERPL BCP-MCNC: 3.1 G/DL
ALP SERPL-CCNC: 64 U/L
ALT SERPL W/O P-5'-P-CCNC: 17 U/L
ANION GAP SERPL CALC-SCNC: 6 MMOL/L
AST SERPL-CCNC: 27 U/L
BASOPHILS # BLD AUTO: 0.01 K/UL
BASOPHILS NFR BLD: 0.1 %
BILIRUB SERPL-MCNC: 1 MG/DL
BUN SERPL-MCNC: 19 MG/DL
CALCIUM SERPL-MCNC: 8.6 MG/DL
CHLORIDE SERPL-SCNC: 108 MMOL/L
CO2 SERPL-SCNC: 26 MMOL/L
CREAT SERPL-MCNC: 0.8 MG/DL
DIFFERENTIAL METHOD: ABNORMAL
EOSINOPHIL # BLD AUTO: 0.1 K/UL
EOSINOPHIL NFR BLD: 2 %
ERYTHROCYTE [DISTWIDTH] IN BLOOD BY AUTOMATED COUNT: 14 %
EST. GFR  (AFRICAN AMERICAN): >60 ML/MIN/1.73 M^2
EST. GFR  (NON AFRICAN AMERICAN): >60 ML/MIN/1.73 M^2
GLUCOSE SERPL-MCNC: 105 MG/DL
HCT VFR BLD AUTO: 45.4 %
HGB BLD-MCNC: 15.1 G/DL
LYMPHOCYTES # BLD AUTO: 1.7 K/UL
LYMPHOCYTES NFR BLD: 23.2 %
MCH RBC QN AUTO: 29.6 PG
MCHC RBC AUTO-ENTMCNC: 33.3 G/DL
MCV RBC AUTO: 89 FL
MONOCYTES # BLD AUTO: 0.9 K/UL
MONOCYTES NFR BLD: 12.6 %
NEUTROPHILS # BLD AUTO: 4.5 K/UL
NEUTROPHILS NFR BLD: 62.1 %
PLATELET # BLD AUTO: 143 K/UL
PMV BLD AUTO: 11.1 FL
POTASSIUM SERPL-SCNC: 4.7 MMOL/L
PROT SERPL-MCNC: 5.7 G/DL
RBC # BLD AUTO: 5.1 M/UL
SODIUM SERPL-SCNC: 140 MMOL/L
WBC # BLD AUTO: 7.16 K/UL

## 2018-05-11 PROCEDURE — 99217 PR OBSERVATION CARE DISCHARGE: CPT | Mod: ,,, | Performed by: NURSE PRACTITIONER

## 2018-05-11 PROCEDURE — 93005 ELECTROCARDIOGRAM TRACING: CPT

## 2018-05-11 PROCEDURE — 94760 N-INVAS EAR/PLS OXIMETRY 1: CPT

## 2018-05-11 PROCEDURE — G0378 HOSPITAL OBSERVATION PER HR: HCPCS

## 2018-05-11 PROCEDURE — 25000003 PHARM REV CODE 250: Performed by: FAMILY MEDICINE

## 2018-05-11 PROCEDURE — 85025 COMPLETE CBC W/AUTO DIFF WBC: CPT

## 2018-05-11 PROCEDURE — 36415 COLL VENOUS BLD VENIPUNCTURE: CPT

## 2018-05-11 PROCEDURE — 93010 ELECTROCARDIOGRAM REPORT: CPT | Mod: ,,, | Performed by: INTERNAL MEDICINE

## 2018-05-11 PROCEDURE — 80053 COMPREHEN METABOLIC PANEL: CPT

## 2018-05-11 RX ADMIN — TAMSULOSIN HYDROCHLORIDE 0.4 MG: 0.4 CAPSULE ORAL at 08:05

## 2018-05-11 NOTE — DISCHARGE INSTRUCTIONS
Call MD or return to the ER for any of the following:  -chest pain or shortness of breath  -severe abdominal or flank pain  -persistent nausea, vomiting or diarrhea  -temperature > 100.4  -pain or burning with urination or inability to urinate

## 2018-05-11 NOTE — ASSESSMENT & PLAN NOTE
Flomax, fluids, pain meds.- does not want to cont flomax upon d/c  F/u urology outpt (already sees Dr Burton)

## 2018-05-11 NOTE — NURSING
Bedside report done, pt in bed with no c/o. POC reviewed, pt voiced understanding. Call bell in reach, instructed to call for needs.  
Pt in stable condition. Discharged home. Reviewed discharge instructions, medications, follow up appts and reportable signs and symptoms with pt and family; state understanding.  
Report received from [facility]. Pt lying in bed with wife at bedside. IV fluids infusing. Pt denies nausea, vomiting, diarrhea or pain. Call bell within reach.   
not applicable

## 2018-05-11 NOTE — DISCHARGE SUMMARY
[facility] Hospital Medicine  Discharge Summary      Patient Name: Negro Bhakta  MRN: [id number]  Admission Date: 5/10/2018  Hospital Length of Stay: 0 days  Discharge Date and Time:  05/11/2018 9:00 AM  Attending Physician: Rubi Lorenz MD   Discharging Provider: Jayleen Nath NP  Primary Care Provider: Darwin Byrne MD      HPI:   84 year old male with no pmh who sees a PCP in [place] comes in with c/o acute onset of left upper quadrant pain. He says that at 2:00 this morning, he was awakened by sharp cramping abdominal pain. This 'felt like a virus' and he was nauseated, but he did not have any vomiting or diarrhea. He had a normal BM afterwards, but his pain persisted and he came in. He has known h/o nephrolithiasis but says this pain didn't feel like his kidney stones.    * No surgery found *      Hospital Course:   He was admitted to Obs with SBO via CT. He was given one dose pain med yesterday and pain was relieved. He has active BS today and is passing gas. + BM yesterday. General surgery consulted and held him NPO. KUB flat and erect ordered daily. Minimal abd tenderness with palp this am. KUB looks much better this am     Consults:   Consults         Status Ordering Provider     Inpatient consult to General Surgery  Once     Provider:  Troy Skinner MD    Acknowledged JULIO ASIF JR          * SBO (small bowel obstruction)    NPO. KUB this am looks much better, try clear liquids this am, tolerated clears  Maint fluids.  Pain meds/antiemetics- none needed  No NG tube.  Surgery consulted    No hx of surgery nor abdominal trauma.          Calcium oxalate renal stones    Flomax, fluids, pain meds.- does not want to cont flomax upon d/c  F/u urology outpt (already sees Dr Burton)            Final Active Diagnoses:    Diagnosis Date Noted POA    PRINCIPAL PROBLEM:  SBO (small bowel obstruction) [K56.609] 05/10/2018 Yes    Calcium oxalate renal stones [N20.0]  Yes       Problems Resolved During this Admission:    Diagnosis Date Noted Date Resolved POA       Discharged Condition: good    Disposition: Home or Self Care    Follow Up:  Follow-up Information     Darwin Byrne MD In 1 week.    Specialty:  Internal Medicine  Why:  outpatient services  Contact information:  [address]  [facility] [address]  [phone]             Kermit Burton Jr, MD.    Specialty:  Urology  Why:  As needed  Contact information:  [address]  [facility] [address]  [phone]                 Patient Instructions:   No discharge procedures on file.    Significant Diagnostic Studies:     Significant Labs:   CBC:     Recent Labs  Lab 05/10/18  0909 05/11/18  0542   WBC 7.84 7.16   HGB 15.6 15.1   HCT 46.3 45.4    143*     CMP:     Recent Labs  Lab 05/10/18  0909 05/11/18  0542    140   K 4.9 4.7    108   CO2 26 26   * 105   BUN 24* 19   CREATININE 1.1 0.8   CALCIUM 9.3 8.6*   PROT 6.4 5.7*   ALBUMIN 3.6 3.1*   BILITOT 0.7 1.0   ALKPHOS 70 64   AST 26 27   ALT 23 17   ANIONGAP 6* 6*   EGFRNONAA >60 >60     Lipase 15    U/a negative    Significant Imaging:    CT renal stone study  1. Multiple left renal cysts.  Renal calcifications without evidence of obstructive uropathy.  2. Bowel stasis versus partial small bowel obstruction primarily within the left upper and mid abdomen affecting the jejunum.  Clinical correlation and follow-up recommended.  3. Additional nonurgent findings as noted above.    KUB admit  Distended small bowel loops overlying the left mid and upper abdomen suggestive of ileus versus early or partial small bowel obstruction.    KUB repeat this am    Last C-scope recent (within a month) - normal  - The entire examined colon is normal. No specimens                         collected.                        - The examined portion of the ileum was normal.      Pending Diagnostic Studies:     None         Medications:  Reconciled Home Medications:       Medication List      CONTINUE taking these medications    diclofenac sodium 1 % Gel  Commonly known as:  VOLTAREN  Apply 2 g topically 4 (four) times daily as needed.     latanoprost 0.005 % ophthalmic solution  Place 1 drop into the right eye every evening.     ONE DAILY MULTIVITAMIN per tablet  Generic drug:  multivitamin  Take 1 tablet by mouth once daily.        STOP taking these medications    warfarin 2.5 MG tablet  Commonly known as:  COUMADIN            Indwelling Lines/Drains at time of discharge:   Lines/Drains/Airways          No matching active lines, drains, or airways          Time spent on the discharge of patient: 25 minutes  Patient was seen and examined on the date of discharge and determined to be suitable for discharge.         Jayleen Nath NP  Department of Hospital Medicine  [facility]

## 2018-05-11 NOTE — PROGRESS NOTES
[facility] Medicine  Progress Note    Patient Name: Negro Bhakta  MRN: [id number]  Patient Class: OP- Observation   Admission Date: 5/10/2018  Length of Stay: 0 days  Attending Physician: Rubi Lorenz MD  Primary Care Provider: Darwin Byrne MD        Subjective:     Principal Problem:SBO (small bowel obstruction)    HPI:  84 year old male with no pmh who sees a PCP in [place] comes in with c/o acute onset of left upper quadrant pain. He says that at 2:00 this morning, he was awakened by sharp cramping abdominal pain. This 'felt like a virus' and he was nauseated, but he did not have any vomiting or diarrhea. He had a normal BM afterwards, but his pain persisted and he came in. He has known h/o nephrolithiasis but says this pain didn't feel like his kidney stones.    Hospital Course:  He was admitted to Obs with SBO via CT. He was given one dose pain med yesterday and pain was relieved. He has active BS today and is passing gas. + BM yesterday. General surgery consulted and held him NPO. KUB flat and erect ordered daily. Minimal abd tenderness with palp this am. KUB looks much better this am    Interval note: better  Review of Systems   Constitutional: Negative for chills and fever.   HENT: Negative for congestion, ear pain, postnasal drip, rhinorrhea, sore throat and trouble swallowing.    Eyes: Negative for redness and itching.   Respiratory: Negative for cough, shortness of breath and wheezing.    Cardiovascular: Negative for chest pain and palpitations.   Gastrointestinal: Negative for abdominal pain, diarrhea, nausea and vomiting.   Genitourinary: Negative for dysuria and frequency.   Skin: Negative for rash.   Neurological: Negative for weakness and headaches.     Objective:     Vital Signs (Most Recent):  Temp: 97.9 °F (36.6 °C) (05/11/18 0705)  Pulse: 66 (05/11/18 0800)  Resp: 17 (05/11/18 0705)  BP: 133/70 (05/11/18 0705)  SpO2: 96 % (05/11/18 0705) Vital  Signs (24h Range):  Temp:  [96 °F (35.6 °C)-98 °F (36.7 °C)] 97.9 °F (36.6 °C)  Pulse:  [58-71] 66  Resp:  [17-18] 17  SpO2:  [92 %-98 %] 96 %  BP: (118-188)/(62-87) 133/70     Weight: 77.1 kg (170 lb)  Body mass index is 23.71 kg/m².    Physical Exam   Constitutional: He is oriented to person, place, and time. He appears well-developed. No distress.   HENT:   Head: Normocephalic and atraumatic.   Eyes: Conjunctivae are normal. Pupils are equal, round, and reactive to light.   Neck: Normal range of motion. Neck supple. No thyromegaly present.   Cardiovascular: Normal rate, regular rhythm, normal heart sounds and intact distal pulses.    Pulmonary/Chest: Effort normal and breath sounds normal. No respiratory distress. He has no wheezes.   Abdominal: Soft. There is no tenderness. There is no rebound and no guarding.   Musculoskeletal: Normal range of motion. He exhibits no edema.   Lymphadenopathy:     He has no cervical adenopathy.   Neurological: He is alert and oriented to person, place, and time.   Skin: Skin is warm and dry. No rash noted.   Psychiatric: He has a normal mood and affect. His behavior is normal.   Nursing note and vitals reviewed.        CRANIAL NERVES     CN III, IV, VI   Pupils are equal, round, and reactive to light.       Significant Labs:   CBC:     Recent Labs  Lab 05/10/18  0909 05/11/18  0542   WBC 7.84 7.16   HGB 15.6 15.1   HCT 46.3 45.4    143*     CMP:     Recent Labs  Lab 05/10/18  0909 05/11/18  0542    140   K 4.9 4.7    108   CO2 26 26   * 105   BUN 24* 19   CREATININE 1.1 0.8   CALCIUM 9.3 8.6*   PROT 6.4 5.7*   ALBUMIN 3.6 3.1*   BILITOT 0.7 1.0   ALKPHOS 70 64   AST 26 27   ALT 23 17   ANIONGAP 6* 6*   EGFRNONAA >60 >60     Lipase 15    U/a negative    Significant Imaging:    CT renal stone study  1. Multiple left renal cysts.  Renal calcifications without evidence of obstructive uropathy.  2. Bowel stasis versus partial small bowel obstruction primarily  within the left upper and mid abdomen affecting the jejunum.  Clinical correlation and follow-up recommended.  3. Additional nonurgent findings as noted above.    KUB admit  Distended small bowel loops overlying the left mid and upper abdomen suggestive of ileus versus early or partial small bowel obstruction.    KUB repeat this am    Last C-scope recent (within a month) - normal  - The entire examined colon is normal. No specimens                         collected.                        - The examined portion of the ileum was normal.    Assessment/Plan:      * SBO (small bowel obstruction)    Resolved.  KUB this am looks much better, try clear liquids this am, tolerated clears  Maint fluids.  Pain meds/antiemetics- none needed  No NG tube.  Surgery consulted    No hx of surgery nor abdominal trauma.          Adenomatous polyp of colon              Calcium oxalate renal stones    Flomax, fluids, pain meds.- does not want to cont flomax upon d/c  F/u urology outpt (already sees Dr Burton)            VTE Risk Mitigation         Ordered     IP VTE HIGH RISK PATIENT  Once      05/10/18 1155     Place BRADLEY hose  Until discontinued      05/10/18 1155     Place BRADLEY hose  Until discontinued      05/10/18 1155              Jeff Moss MD  Department of Hospital Medicine   [facility]

## 2018-05-11 NOTE — ASSESSMENT & PLAN NOTE
NPO. KUB this am looks much better, try clear liquids this am, tolerated clears  Maint fluids.  Pain meds/antiemetics- none needed  No NG tube.  Surgery consulted    No hx of surgery nor abdominal trauma.

## 2018-05-11 NOTE — SUBJECTIVE & OBJECTIVE
Interval note: better  Review of Systems   Constitutional: Negative for chills and fever.   HENT: Negative for congestion, ear pain, postnasal drip, rhinorrhea, sore throat and trouble swallowing.    Eyes: Negative for redness and itching.   Respiratory: Negative for cough, shortness of breath and wheezing.    Cardiovascular: Negative for chest pain and palpitations.   Gastrointestinal: Positive for abdominal pain and nausea. Negative for diarrhea and vomiting.   Genitourinary: Negative for dysuria and frequency.   Skin: Negative for rash.   Neurological: Negative for weakness and headaches.     Objective:     Vital Signs (Most Recent):  Temp: 97.9 °F (36.6 °C) (05/11/18 0705)  Pulse: 71 (05/11/18 0705)  Resp: 17 (05/11/18 0705)  BP: 133/70 (05/11/18 0705)  SpO2: 96 % (05/11/18 0705) Vital Signs (24h Range):  Temp:  [96 °F (35.6 °C)-98 °F (36.7 °C)] 97.9 °F (36.6 °C)  Pulse:  [58-74] 71  Resp:  [17-20] 17  SpO2:  [92 %-98 %] 96 %  BP: (118-188)/(62-87) 133/70     Weight: 77.1 kg (170 lb)  Body mass index is 23.71 kg/m².    Physical Exam   Constitutional: He is oriented to person, place, and time. He appears well-developed. No distress.   HENT:   Head: Normocephalic and atraumatic.   Eyes: Conjunctivae are normal. Pupils are equal, round, and reactive to light.   Neck: Normal range of motion. Neck supple. No thyromegaly present.   Cardiovascular: Normal rate, regular rhythm, normal heart sounds and intact distal pulses.    Pulmonary/Chest: Effort normal and breath sounds normal. No respiratory distress. He has no wheezes.   Abdominal: Soft. There is tenderness. There is no rebound and no guarding.   Decreased BS.  Tenderness to LUQ with + bowels palpated on exam   Musculoskeletal: Normal range of motion. He exhibits no edema.   Lymphadenopathy:     He has no cervical adenopathy.   Neurological: He is alert and oriented to person, place, and time.   Skin: Skin is warm and dry. No rash noted.   Psychiatric: He has a  normal mood and affect. His behavior is normal.   Nursing note and vitals reviewed.        CRANIAL NERVES     CN III, IV, VI   Pupils are equal, round, and reactive to light.       Significant Labs:   CBC:     Recent Labs  Lab 05/10/18  0909 05/11/18  0542   WBC 7.84 7.16   HGB 15.6 15.1   HCT 46.3 45.4    143*     CMP:     Recent Labs  Lab 05/10/18  0909 05/11/18  0542    140   K 4.9 4.7    108   CO2 26 26   * 105   BUN 24* 19   CREATININE 1.1 0.8   CALCIUM 9.3 8.6*   PROT 6.4 5.7*   ALBUMIN 3.6 3.1*   BILITOT 0.7 1.0   ALKPHOS 70 64   AST 26 27   ALT 23 17   ANIONGAP 6* 6*   EGFRNONAA >60 >60     Lipase 15    U/a negative    Significant Imaging:    CT renal stone study  1. Multiple left renal cysts.  Renal calcifications without evidence of obstructive uropathy.  2. Bowel stasis versus partial small bowel obstruction primarily within the left upper and mid abdomen affecting the jejunum.  Clinical correlation and follow-up recommended.  3. Additional nonurgent findings as noted above.    KUB admit  Distended small bowel loops overlying the left mid and upper abdomen suggestive of ileus versus early or partial small bowel obstruction.    KUB repeat this am    Last C-scope recent (within a month) - normal  - The entire examined colon is normal. No specimens                         collected.                        - The examined portion of the ileum was normal.

## 2018-05-11 NOTE — HOSPITAL COURSE
He was admitted to Obs with SBO via CT. He was given one dose pain med yesterday and pain was relieved. He has active BS today and is passing gas. + BM yesterday. General surgery consulted and held him NPO. KUB flat and erect ordered daily. Minimal abd tenderness with palp this am. KUB looks much better this am

## 2018-05-11 NOTE — SUBJECTIVE & OBJECTIVE
Interval note: better  Review of Systems   Constitutional: Negative for chills and fever.   HENT: Negative for congestion, ear pain, postnasal drip, rhinorrhea, sore throat and trouble swallowing.    Eyes: Negative for redness and itching.   Respiratory: Negative for cough, shortness of breath and wheezing.    Cardiovascular: Negative for chest pain and palpitations.   Gastrointestinal: Negative for abdominal pain, diarrhea, nausea and vomiting.   Genitourinary: Negative for dysuria and frequency.   Skin: Negative for rash.   Neurological: Negative for weakness and headaches.     Objective:     Vital Signs (Most Recent):  Temp: 97.9 °F (36.6 °C) (05/11/18 0705)  Pulse: 66 (05/11/18 0800)  Resp: 17 (05/11/18 0705)  BP: 133/70 (05/11/18 0705)  SpO2: 96 % (05/11/18 0705) Vital Signs (24h Range):  Temp:  [96 °F (35.6 °C)-98 °F (36.7 °C)] 97.9 °F (36.6 °C)  Pulse:  [58-71] 66  Resp:  [17-18] 17  SpO2:  [92 %-98 %] 96 %  BP: (118-188)/(62-87) 133/70     Weight: 77.1 kg (170 lb)  Body mass index is 23.71 kg/m².    Physical Exam   Constitutional: He is oriented to person, place, and time. He appears well-developed. No distress.   HENT:   Head: Normocephalic and atraumatic.   Eyes: Conjunctivae are normal. Pupils are equal, round, and reactive to light.   Neck: Normal range of motion. Neck supple. No thyromegaly present.   Cardiovascular: Normal rate, regular rhythm, normal heart sounds and intact distal pulses.    Pulmonary/Chest: Effort normal and breath sounds normal. No respiratory distress. He has no wheezes.   Abdominal: Soft. There is no tenderness. There is no rebound and no guarding.   Musculoskeletal: Normal range of motion. He exhibits no edema.   Lymphadenopathy:     He has no cervical adenopathy.   Neurological: He is alert and oriented to person, place, and time.   Skin: Skin is warm and dry. No rash noted.   Psychiatric: He has a normal mood and affect. His behavior is normal.   Nursing note and vitals  reviewed.        CRANIAL NERVES     CN III, IV, VI   Pupils are equal, round, and reactive to light.       Significant Labs:   CBC:     Recent Labs  Lab 05/10/18  0909 05/11/18  0542   WBC 7.84 7.16   HGB 15.6 15.1   HCT 46.3 45.4    143*     CMP:     Recent Labs  Lab 05/10/18  0909 05/11/18  0542    140   K 4.9 4.7    108   CO2 26 26   * 105   BUN 24* 19   CREATININE 1.1 0.8   CALCIUM 9.3 8.6*   PROT 6.4 5.7*   ALBUMIN 3.6 3.1*   BILITOT 0.7 1.0   ALKPHOS 70 64   AST 26 27   ALT 23 17   ANIONGAP 6* 6*   EGFRNONAA >60 >60     Lipase 15    U/a negative    Significant Imaging:    CT renal stone study  1. Multiple left renal cysts.  Renal calcifications without evidence of obstructive uropathy.  2. Bowel stasis versus partial small bowel obstruction primarily within the left upper and mid abdomen affecting the jejunum.  Clinical correlation and follow-up recommended.  3. Additional nonurgent findings as noted above.    KUB admit  Distended small bowel loops overlying the left mid and upper abdomen suggestive of ileus versus early or partial small bowel obstruction.    KUB repeat this am    Last C-scope recent (within a month) - normal  - The entire examined colon is normal. No specimens                         collected.                        - The examined portion of the ileum was normal.

## 2018-05-11 NOTE — PLAN OF CARE
05/11/18 1313   Discharge Assessment   Assessment Type Discharge Planning Reassessment       Pt discharged home today with family. No needs or concerns at this time.

## 2018-05-11 NOTE — ASSESSMENT & PLAN NOTE
Resolved.  KUB this am looks much better, try clear liquids this am, tolerated clears  Maint fluids.  Pain meds/antiemetics- none needed  No NG tube.  Surgery consulted    No hx of surgery nor abdominal trauma.

## 2018-05-11 NOTE — CARE UPDATE
Quiet hours, rested well, no further pain medication administered. Remains NPO as per orders, IVF's continue as ordered. Discussed POC with pt/spouse, voiced understanding. Instructed to call for needs.

## 2018-05-17 ENCOUNTER — OFFICE VISIT (OUTPATIENT)
Dept: INTERNAL MEDICINE | Facility: CLINIC | Age: 83
End: 2018-05-17
Payer: MEDICARE

## 2018-05-17 VITALS
BODY MASS INDEX: 24.39 KG/M2 | DIASTOLIC BLOOD PRESSURE: 60 MMHG | HEIGHT: 71 IN | HEART RATE: 66 BPM | WEIGHT: 174.19 LBS | SYSTOLIC BLOOD PRESSURE: 110 MMHG

## 2018-05-17 DIAGNOSIS — K56.609 SBO (SMALL BOWEL OBSTRUCTION): Primary | ICD-10-CM

## 2018-05-17 PROCEDURE — 99999 PR PBB SHADOW E&M-EST. PATIENT-LVL III: CPT | Mod: PBBFAC,,, | Performed by: INTERNAL MEDICINE

## 2018-05-17 PROCEDURE — 99213 OFFICE O/P EST LOW 20 MIN: CPT | Mod: S$GLB,,, | Performed by: INTERNAL MEDICINE

## 2018-05-21 NOTE — PROGRESS NOTES
HISTORY OF PRESENT ILLNESS:  He is an 84-year-old gentleman coming in today to   follow up his ongoing medical problems and for hospital followup.  Mr. Bhakta   was in the hospital recently.  He had a colonoscopy on 04/09/2018 that was   unremarkable.  This should follow up in five years, and then on 05/10/2018, he   came to the Emergency Room at [facility] with abdominal pain, severe crampy.  He   had decreased appetite, but no nausea, no vomiting.  X-rays were done.  A CAT   scan was done. It showed a partial small-bowel obstruction.  He had bowel rest   and his bowels started moving the next day, and he was able to be discharged.    CAT scan was reviewed, showed some left renal cyst.  Bowel stasis versus   possible small-bowel obstruction effecting mid abdomen.  Since May 10th, he says   he has been feeling well.  He has not had any abdominal pain.  His bowels are   moving regularly.  He reports prior to the small bowel obstruction he was not   using any new medications.  He has been pretty healthy in the past.  He had both   knees replaced in 2013.  He has a history of adenomatous polyps in the colon   and a history of some renal stones, but has not had any difficulty.    PHYSICAL EXAMINATION:  GENERAL:  He is a well-appearing 84-year-old gentleman in no acute distress.  VITAL SIGNS:  Unremarkable.  HEENT:  Ear canals are open.  TMs are clear.  Oropharynx is clear.  NECK:  Supple.  He has no JVD.  Thyroid is not enlarged.  CARDIOVASCULAR:  S1 and S2, regular rate and rhythm without murmur, gallop or   rub.  ABDOMEN:  Soft, nontender, no hepatosplenomegaly.    ASSESSMENT:  History of small-bowel obstruction, resolved.  I do not really have   anything to offer right now.  He seems to be doing pretty well.  We explored   the possibilities why he might have had it.  I do not think this is related to   his colonoscopy he had a month.  He was not taking any new medications.  The   wife blames the spicy food he ate.   As much as I would love to blame spicy food,   I do not think I can get away with that either though.  We discussed   appropriate diet, appropriate exercise for him.  Follow up with him as scheduled   otherwise.  No further GI workup needs to be done at this time.      GAGAN  dd: 05/21/2018 08:01:06 (CDT)  td: 05/21/2018 15:36:17 (CDT)  Doc ID   #[id number]  Job ID #[id number]    CC:

## 2018-06-07 ENCOUNTER — OFFICE VISIT (OUTPATIENT)
Dept: OPTOMETRY | Facility: CLINIC | Age: 83
End: 2018-06-07
Payer: MEDICARE

## 2018-06-07 ENCOUNTER — CLINICAL SUPPORT (OUTPATIENT)
Dept: OPHTHALMOLOGY | Facility: CLINIC | Age: 83
End: 2018-06-07
Payer: MEDICARE

## 2018-06-07 DIAGNOSIS — H40.053 OCULAR HYPERTENSION, BILATERAL: Primary | ICD-10-CM

## 2018-06-07 DIAGNOSIS — H26.9 CORTICAL CATARACT OF BOTH EYES: ICD-10-CM

## 2018-06-07 DIAGNOSIS — H25.13 NUCLEAR SCLEROSIS, BILATERAL: ICD-10-CM

## 2018-06-07 PROCEDURE — 99999 PR PBB SHADOW E&M-EST. PATIENT-LVL III: CPT | Mod: PBBFAC,,, | Performed by: OPTOMETRIST

## 2018-06-07 PROCEDURE — 92012 INTRM OPH EXAM EST PATIENT: CPT | Mod: S$GLB,,, | Performed by: OPTOMETRIST

## 2018-06-07 PROCEDURE — 92083 EXTENDED VISUAL FIELD XM: CPT | Mod: S$GLB,,, | Performed by: OPTOMETRIST

## 2018-06-07 NOTE — PATIENT INSTRUCTIONS
or diagnosis of bilateral nuclear sclerotic/cortical cataract.   VA with last refraction essentially stable in each eye.  Refraction not repeated today.     Prior diagnosis of ocular hypertension in the right eye, with visual field changes suggestive of early inferior acuate defect in the right eye on last HVF test done.   Currently using Latanoprost 0.005% ophthalmic solution in the right eye (only) every evening for IOP control/reduction.  IOP today normal in the right eye (with drops), and normal in the left eye (without drops).  Repeat HVF test (24-2 EUNICE Standard) done today.  Reviewed results and discussed with Mr. Beniteztanya.    No change made to the last spectacle lens Rx issued in October, 2017.  Recommend continue to wear glasses full-time.     Continue Latanoprost 0.005% ophthalmic solution every evening in the right eye only.  Return in December, 2018 for recheck.

## 2018-06-07 NOTE — PROGRESS NOTES
"HPI     Concerns About Ocular Health    Additional comments: 6 month f/u on ocular hypertension in right eye,   with repeat HVF test.   Using Latanoprost 0.005% oph solu q day in the right eye only.            Comments     Patient is in for 6 month f/u and repeat HVF test   Being followed for (diagnosis):Ocular Hypertension     Date last seen:  10/25/2017    Doctor last seen:  Dr. Riggins     Prescribed eye medications(s) using:  Latanoprost 0.005% OD only    OTC eye medication(s) using:  None     Signs/symptoms of condition resolved/better/stable/worse?:  Stable     Allergies/Medications reviewed today?  Yes     No diabetes   No HBP  Recently hospitilized for intestinal problem - otherwise, no significant   health problems.     PD 61/57  Reading distance 18"           Last edited by Yair Riggins, OD on 6/7/2018 10:00 AM. (History)            Assessment /Plan     For exam results, see Encounter Report.    1. Ocular hypertension, bilateral  Cruz Visual Field - OU - Extended - Both Eyes   2. Nuclear sclerosis, bilateral     3. Cortical cataract of both eyes                    Piror diagnosis of bilateral nuclear sclerotic/cortical cataract.   VA with last refraction essentially stable in each eye.  Refraction not repeated today.     Prior diagnosis of ocular hypertension in the right eye, with visual field changes suggestive of early inferior acuate defect in the right eye on last HVF test done.   Currently using Latanoprost 0.005% ophthalmic solution in the right eye (only) every evening for IOP control/reduction.  IOP today normal in the right eye (with drops), and normal in the left eye (without drops).  Repeat HVF test (24-2 EUNICE Standard) done today.  Reviewed results and discussed with Mr. Bhakta.    No change made to the last spectacle lens Rx issued in October, 2017.  Recommend continue to wear glasses full-time.     Continue Latanoprost 0.005% ophthalmic solution every evening in the right eye " only.  Return in December, 2018 for recheck.

## 2018-08-24 ENCOUNTER — PES CALL (OUTPATIENT)
Dept: ADMINISTRATIVE | Facility: CLINIC | Age: 83
End: 2018-08-24

## 2018-11-26 ENCOUNTER — TELEPHONE (OUTPATIENT)
Dept: INTERNAL MEDICINE | Facility: CLINIC | Age: 83
End: 2018-11-26

## 2018-11-26 DIAGNOSIS — Z12.11 ENCOUNTER FOR SCREENING COLONOSCOPY: ICD-10-CM

## 2018-11-26 DIAGNOSIS — H40.051 OCULAR HYPERTENSION OF RIGHT EYE: Primary | ICD-10-CM

## 2018-11-26 DIAGNOSIS — K56.609 SBO (SMALL BOWEL OBSTRUCTION): ICD-10-CM

## 2018-11-26 DIAGNOSIS — R79.9 ABNORMAL BLOOD CHEMISTRY: ICD-10-CM

## 2018-11-26 NOTE — TELEPHONE ENCOUNTER
----- Message from Lara Brown sent at 11/26/2018  1:54 PM CST -----  Contact: self/  Doctor appointment and lab have been scheduled.  Please link lab orders to the lab appointment.  Date of doctor appointment: 11/28/18   Physical or EP:  phys  Date of lab appointment: 11/27/18   Comments:

## 2018-11-27 ENCOUNTER — LAB VISIT (OUTPATIENT)
Dept: LAB | Facility: HOSPITAL | Age: 83
End: 2018-11-27
Attending: INTERNAL MEDICINE
Payer: MEDICARE

## 2018-11-27 DIAGNOSIS — R79.9 ABNORMAL BLOOD CHEMISTRY: ICD-10-CM

## 2018-11-27 DIAGNOSIS — K56.609 SBO (SMALL BOWEL OBSTRUCTION): ICD-10-CM

## 2018-11-27 LAB
ALBUMIN SERPL BCP-MCNC: 3.9 G/DL
ALP SERPL-CCNC: 67 U/L
ALT SERPL W/O P-5'-P-CCNC: 22 U/L
ANION GAP SERPL CALC-SCNC: 9 MMOL/L
AST SERPL-CCNC: 28 U/L
BILIRUB SERPL-MCNC: 0.8 MG/DL
BUN SERPL-MCNC: 20 MG/DL
CALCIUM SERPL-MCNC: 9.6 MG/DL
CHLORIDE SERPL-SCNC: 103 MMOL/L
CHOLEST SERPL-MCNC: 152 MG/DL
CHOLEST/HDLC SERPL: 3.2 {RATIO}
CO2 SERPL-SCNC: 25 MMOL/L
CREAT SERPL-MCNC: 1 MG/DL
EST. GFR  (AFRICAN AMERICAN): >60 ML/MIN/1.73 M^2
EST. GFR  (NON AFRICAN AMERICAN): >60 ML/MIN/1.73 M^2
GLUCOSE SERPL-MCNC: 96 MG/DL
HDLC SERPL-MCNC: 47 MG/DL
HDLC SERPL: 30.9 %
LDLC SERPL CALC-MCNC: 82 MG/DL
NONHDLC SERPL-MCNC: 105 MG/DL
POTASSIUM SERPL-SCNC: 4.7 MMOL/L
PROT SERPL-MCNC: 6.8 G/DL
SODIUM SERPL-SCNC: 137 MMOL/L
TRIGL SERPL-MCNC: 115 MG/DL

## 2018-11-27 PROCEDURE — 80061 LIPID PANEL: CPT | Mod: HCWC

## 2018-11-27 PROCEDURE — 36415 COLL VENOUS BLD VENIPUNCTURE: CPT | Mod: HCWC

## 2018-11-27 PROCEDURE — 80053 COMPREHEN METABOLIC PANEL: CPT | Mod: HCWC

## 2018-11-28 ENCOUNTER — HOSPITAL ENCOUNTER (OUTPATIENT)
Dept: RADIOLOGY | Facility: HOSPITAL | Age: 83
Discharge: HOME | End: 2018-11-28
Attending: INTERNAL MEDICINE
Payer: MEDICARE

## 2018-11-28 ENCOUNTER — OFFICE VISIT (OUTPATIENT)
Dept: PAIN MEDICINE | Facility: CLINIC | Age: 83
End: 2018-11-28
Payer: MEDICARE

## 2018-11-28 ENCOUNTER — OFFICE VISIT (OUTPATIENT)
Dept: INTERNAL MEDICINE | Facility: CLINIC | Age: 83
End: 2018-11-28
Payer: MEDICARE

## 2018-11-28 VITALS
HEART RATE: 63 BPM | DIASTOLIC BLOOD PRESSURE: 70 MMHG | HEIGHT: 71 IN | BODY MASS INDEX: 24.72 KG/M2 | WEIGHT: 176.56 LBS | OXYGEN SATURATION: 98 % | SYSTOLIC BLOOD PRESSURE: 128 MMHG

## 2018-11-28 VITALS
WEIGHT: 174.19 LBS | BODY MASS INDEX: 24.39 KG/M2 | TEMPERATURE: 97 F | SYSTOLIC BLOOD PRESSURE: 128 MMHG | HEIGHT: 71 IN | HEART RATE: 74 BPM | DIASTOLIC BLOOD PRESSURE: 66 MMHG

## 2018-11-28 DIAGNOSIS — Z96.659 STATUS POST TOTAL KNEE REPLACEMENT, UNSPECIFIED LATERALITY: ICD-10-CM

## 2018-11-28 DIAGNOSIS — M47.816 LUMBAR SPONDYLOSIS: Primary | ICD-10-CM

## 2018-11-28 DIAGNOSIS — M51.36 DDD (DEGENERATIVE DISC DISEASE), LUMBAR: ICD-10-CM

## 2018-11-28 DIAGNOSIS — K56.609 SBO (SMALL BOWEL OBSTRUCTION): ICD-10-CM

## 2018-11-28 DIAGNOSIS — M17.0 PRIMARY OSTEOARTHRITIS OF BOTH KNEES: ICD-10-CM

## 2018-11-28 DIAGNOSIS — M19.011 PRIMARY OSTEOARTHRITIS OF BOTH SHOULDERS: ICD-10-CM

## 2018-11-28 DIAGNOSIS — M54.50 LOW BACK PAIN, NON-SPECIFIC: ICD-10-CM

## 2018-11-28 DIAGNOSIS — D12.6 ADENOMATOUS POLYP OF COLON, UNSPECIFIED PART OF COLON: Primary | ICD-10-CM

## 2018-11-28 DIAGNOSIS — M19.012 PRIMARY OSTEOARTHRITIS OF BOTH SHOULDERS: ICD-10-CM

## 2018-11-28 DIAGNOSIS — S32.030A CLOSED COMPRESSION FRACTURE OF THIRD LUMBAR VERTEBRA, INITIAL ENCOUNTER: ICD-10-CM

## 2018-11-28 PROBLEM — Z12.11 SCREENING FOR COLON CANCER: Status: RESOLVED | Noted: 2018-04-09 | Resolved: 2018-11-28

## 2018-11-28 PROCEDURE — 72100 X-RAY EXAM L-S SPINE 2/3 VWS: CPT | Mod: TC,HCNC

## 2018-11-28 PROCEDURE — 1101F PT FALLS ASSESS-DOCD LE1/YR: CPT | Mod: CPTII,HCNC,S$GLB, | Performed by: INTERNAL MEDICINE

## 2018-11-28 PROCEDURE — 72100 X-RAY EXAM L-S SPINE 2/3 VWS: CPT | Mod: 26,HCNC,, | Performed by: RADIOLOGY

## 2018-11-28 PROCEDURE — 99213 OFFICE O/P EST LOW 20 MIN: CPT | Mod: HCNC,S$GLB,, | Performed by: NURSE PRACTITIONER

## 2018-11-28 PROCEDURE — 99999 PR PBB SHADOW E&M-EST. PATIENT-LVL III: CPT | Mod: PBBFAC,HCNC,, | Performed by: INTERNAL MEDICINE

## 2018-11-28 PROCEDURE — 99499 UNLISTED E&M SERVICE: CPT | Mod: S$GLB,,, | Performed by: INTERNAL MEDICINE

## 2018-11-28 PROCEDURE — 99999 PR PBB SHADOW E&M-EST. PATIENT-LVL III: CPT | Mod: PBBFAC,HCNC,, | Performed by: NURSE PRACTITIONER

## 2018-11-28 PROCEDURE — 99214 OFFICE O/P EST MOD 30 MIN: CPT | Mod: HCNC,S$GLB,, | Performed by: INTERNAL MEDICINE

## 2018-11-28 PROCEDURE — 1101F PT FALLS ASSESS-DOCD LE1/YR: CPT | Mod: CPTII,HCNC,S$GLB, | Performed by: NURSE PRACTITIONER

## 2018-11-28 NOTE — LETTER
November 28, 2018      Darwin Byrne Jr., MD  [address]  [facility] [address]           [facility]  [address]  [facility] [address]  Phone: [phone]  Fax: [phone]          Patient: Negro Bhakta   MR Number: [id number]   YOB: 1933   Date of Visit: 11/28/2018       Dear Dr. Darwin Byrne Jr.:    Thank you for referring Negro Bhakta to me for evaluation. Attached you will find relevant portions of my assessment and plan of care.    If you have questions, please do not hesitate to call me. I look forward to following Negro Bhakta along with you.    Sincerely,    Dorothea Vaughn, [facility]    Enclosure  CC:  No Recipients    If you would like to receive this communication electronically, please contact [email or web address] or [phone] to request more information on [employer] Link access.    For providers and/or their staff who would like to refer a patient to [facility], please contact us through our one-stop-shop provider referral line, [facility], at [phone].    If you feel you have received this communication in error or would no longer like to receive these types of communications, please e-mail [email or web address]

## 2018-11-28 NOTE — PROGRESS NOTES
"He is a 85-year-old gentleman coming in today for a physical exam and   followup of his ongoing medical problems.     He has a history of renal   stones. He had a lithotripsy and renal stents in the distant past. He has seen Urology for this. He has BPH, but can't tolerate flomax. He is having to urinate 2-3 times at night. . He is urinating well the rest of the time. He saw Urology last in June-         He has had bilateral knee replacement in 2013 and is doing well.  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  It has been bothering him for the last 60 years. Once in a while he takes a Tylenol.   Last 6 months it has been worse. Pain is lower back- It does not go down the legs. During the day when he is walking it is ok- but bending bothers it.       colon polyps- he had a c-scope 4/11-and a repeat in 4/2014-- he had 4 polyps on the last c-scope and is due back in 4/2017. No Gi complaint. No GI complaints       He had a colonoscopy on 04/09/2018 that was   unremarkable.  This should follow up in five years, and then on 05/10/2018, he   came to the Emergency Room at [facility] with abdominal pain, severe crampy.  He   had decreased appetite, but no nausea, no vomiting.  X-rays were done.  A CAT   scan was done. It showed a partial small-bowel obstruction.  He had bowel rest   and his bowels started moving the next day, and he was able to be discharged.    CAT scan was reviewed, showed some left renal cyst.  Bowel stasis versus   possible small-bowel obstruction effecting mid abdomen.  Since May 10th, he says   he has been feeling well.       SOCIAL HISTORY: He drinks beer occasionally. He is . He quit   smoking 30 years ago.         ROS : Gen - no fatigue or significant weight change  Eyes - no eye pain or visual changes  ENT - no hoarseness or sore throat.   CV - no CP or SOB  Pulm - no cough or wheezing  GI - no N/V/D   no dysuria or incontinence  MS - as " "above  Skin - no rash, or c/o of skin lesions  Neuro - no HA, dizziness. memory is good.   Heme - no abnormal bleeding or bruising  Endo - no polydipsia, or temperature changes  Psych - no anxiety or depression         PHYSICAL EXAM: He is a well-appearing 85-year-old gentleman in no acute   Distress.  /70 (BP Location: Left arm, Patient Position: Sitting, BP Method: Large (Manual))   Pulse 63   Ht 5' 11" (1.803 m)   Wt 80.1 kg (176 lb 9.4 oz)   SpO2 98%   BMI 24.63 kg/m²     NECK: Supple. No JVD. Thyroid is not enlarged.   Nodes: No cervical , axillary , or inginual adenopathy.   CHest: His chest is clear and without wheeze.   CARDIOVASCULAR: S1, S2. Regular rate and rhythm without murmur, gallop, or   rub.   ABDOMEN: Soft, nontender. No hepatosplenomegaly. No guarding or rebound   tenderness.   LOWER EXTREMITIES: He has bilateral arthritic changes of the knees. Left   lower extremity has no edema. Right lower extremity, no edema. Dorsal   pedis pulses are normal. Scars haling well.   He has no cervical, axillary, or inguinal adenopathy.   He has bilateral biceps tears and muscle is more distal- good strength Normal triceps, and patellar deep tendon reflexes.     ASSESSMENT:   1. S/p bilateral knee replacements   2. H/o Renal stones.   3.  BPH-- elevated PSA - follow with urology- PSA pending .   4.History of colon polyps.   5. 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   6. Back pain     RECOMMENDATIONS:   1. I recommend low-fat/low-cholesterol diet and exercise.   2. xray back and will refer to the pain clinic.      Up today with Vaccines.  WIfe with him today                 "

## 2018-11-28 NOTE — PROGRESS NOTES
Subjective:     Patient ID: Negro Bhakta is a 85 y.o. male.    Chief Complaint: Pain    Consulted by: Darwin Byrne MD     Disclaimer: This note was generated using voice recognition software.  There may be a typographical errors that were missed during proofreading.      HPI:    Negro Bhakta is a 85 y.o. male who presents today with bilateral shoulder pain, bilateral hand pain, and neck pain. 3 month history of bilateral shoulder pain, is  8/10 achy pain, worse at night, worse with overhead activity. Bilateral hand pain 5/10, with numbness, tingling, weakness and getting progressively worse over the last several years. Neck pain is 3/10 achy pain, with cracking/popping with rotation and sidebending- does not radiate into UE. This pain is described in detail below.    Interval History (11/28/2018):  The patient returns to discuss lower back pain.  The pain starts across the lower back without radiation.  It is worse with sitting and standing.  It is worse in the morning.  He had XRAYs today as ordered by his PCP.  Results show DJD, DDD and L3 compression fracture.  He reports that he is a retired [employer]  and has been active.  He has not had an MRI.  He has not had injections for his back.  His knee pain has been tolerable.  He has a history of bilateral TKA by Dr. [facility] in the past.  His pain today is 9/10.    Physical Therapy: in the past    Non-pharmacologic Treatment: none         · TENS? no    Pain Medications:         · Currently taking: tylenol    · Has tried in the past:  advil    · Has not tried: anything else    Blood thinners: none    Interventional Therapies:   Shoulder injections- helpful    Relevant Surgeries: bilateral TKA- Dr. [facility]    Affecting sleep? yes    Affecting daily activities? yes    Depressive symptoms? no          · SI/HI? No    Work status: works on his land cultivating crops      Low-back Pain   This is a chronic problem. The current episode  started more than 1 month ago. The problem occurs constantly. The problem has been gradually worsening since onset. The pain is present in the lumbar spine. The pain does not radiate. The quality of the pain is described as burning. The pain is at a severity of 8/10. The pain is mild. The pain is the same all the time. The symptoms are aggravated by lying down. Stiffness is present at night and all day. He has tried NSAIDs and bed rest for the symptoms. The treatment provided no relief. Physical therapy was not tried.      Last 3 PDI Scores 11/28/2018 2/3/2017   Pain Disability Index (PDI) 28 24       Review of Systems   Musculoskeletal: Positive for back pain.   All other systems reviewed and are negative.            Past Medical History:   Diagnosis Date    Allergy     Arthritis     Baker's cyst     BPH (benign prostatic hypertrophy)     Calcium oxalate renal stones     Cataract     Elevated PSA     Glaucoma        Past Surgical History:   Procedure Laterality Date    ADENOIDECTOMY      ARTHROPLASTY, KNEE, TOTAL Left 7/10/2013    [facility] 2ND FLR    ARTHROPLASTY, KNEE, TOTAL Right 3/13/2013    [facility] 2ND FLR    COLONOSCOPY N/A 4/9/2018    Procedure: COLONOSCOPY;  Surgeon: JAIRO Hopper MD;  Location: [facility] ([address] [place] [address]);  Service: Endoscopy;  Laterality: N/A;    COLONOSCOPY N/A 4/9/2018    [facility] at [facility] (4TH FLR)    COLONOSCOPY N/A 4/8/2014    Performed by Ambrosio Boateng MD at [facility] (4TH FLR)    EXTRACORPOREAL SHOCK WAVE LITHOTRIPSY      JOINT REPLACEMENT Bilateral     TONSILLECTOMY      TOTAL KNEE ARTHROPLASTY  7/10/2013    Left    TOTAL KNEE ARTHROPLASTY  3/13/2013    Right       Review of patient's allergies indicates:   Allergen Reactions    Amoxicillin Other (See Comments)     Blisters to hands       Current Outpatient Medications   Medication Sig Dispense Refill     latanoprost 0.005 % ophthalmic solution Place 1 drop into the right eye every evening. 7.5 mL 5    multivitamin (ONE DAILY MULTIVITAMIN) per tablet Take 1 tablet by mouth once daily.       No current facility-administered medications for this visit.        Family History   Problem Relation Age of Onset    Cataracts Father     Cancer Sister         Retinal cancer    Esophageal cancer Mother          at 49 years and in the year 195    Cancer Mother         stomach    No Known Problems Brother     No Known Problems Daughter     No Known Problems Son     No Known Problems Son     No Known Problems Son     No Known Problems Son     No Known Problems Son     No Known Problems Maternal Aunt     No Known Problems Maternal Uncle     No Known Problems Paternal Aunt     No Known Problems Paternal Uncle     No Known Problems Maternal Grandmother     No Known Problems Maternal Grandfather     No Known Problems Paternal Grandmother     No Known Problems Paternal Grandfather     Amblyopia Neg Hx     Blindness Neg Hx     Diabetes Neg Hx     Glaucoma Neg Hx     Hypertension Neg Hx     Macular degeneration Neg Hx     Retinal detachment Neg Hx     Strabismus Neg Hx     Stroke Neg Hx     Thyroid disease Neg Hx        Social History     Socioeconomic History    Marital status:      Spouse name: Delmis    Number of children: Not on file    Years of education: Not on file    Highest education level: Not on file   Social Needs    Financial resource strain: Not on file    Food insecurity - worry: Not on file    Food insecurity - inability: Not on file    Transportation needs - medical: Not on file    Transportation needs - non-medical: Not on file   Occupational History    Not on file   Tobacco Use    Smoking status: Former Smoker     Last attempt to quit: 1975     Years since quittin.0    Smokeless tobacco: Never Used   Substance and Sexual Activity    Alcohol use: Yes      "Alcohol/week: 0.6 oz     Types: 1 Cans of beer per week    Drug use: No    Sexual activity: Yes     Partners: Female   Other Topics Concern    Not on file   Social History Narrative    Not on file       Objective:     Vitals:    11/28/18 1113   BP: 128/66   Pulse: 74   Temp: 96.9 °F (36.1 °C)   TempSrc: Oral   Weight: 79 kg (174 lb 3.2 oz)   Height: 5' 11" (1.803 m)   PainSc:   9   PainLoc: Back       GEN:  Well developed, well nourished.  No acute distress.  No pain behavior.  HEENT:  No trauma.  Mucous membranes moist.  Nares patent bilaterally.  PSYCH: Normal affect. Thought content appropriate.  CHEST:  Breathing symmetric.  No audible wheezing.  ABD: Soft, non-tender, non-distended.  SKIN:  Warm, pink, dry.  No rash on exposed areas.    EXT:  No cyanosis, clubbing, or edema.  No color change or changes in nail or hair growth.  NEURO/MUSCULOSKELETAL:  Fully alert, oriented, and appropriate. Speech normal eulalia. No cranial nerve deficits.   Gait: non-antalgic.  5/5 motor strength throughout upper extremities.   Sensory: Normal sensation.  Reflexes: 2+ and symmetric throughout.  negative Gallegos's bilaterally.  L-Spine:  decreased ROM with pain on extension and lateral rotation. + facet loading bilaterally.  negative SLR bilaterally.    TTP over lumbar facet joints and bilateral SI joints.  MANUELITO is positive on the left.      Imaging:      Narrative     EXAMINATION:  XR LUMBAR SPINE AP AND LATERAL    CLINICAL HISTORY:  Spondylolisthesis;Low back pain, risk factors (osteoporosis or chronic steroid use or elderly);  Low back pain    FINDINGS:  There are L5 pars defects with grade 2 anterolisthesis and moderate-severe DJD.  There is moderate DJD above this level with grade 1 anterolisthesis of L4 on L5.  There is a concave compression deformity upper endplate of L3.      Impression       See above         Assessment:     Encounter Diagnoses   Name Primary?    Lumbar spondylosis Yes    Primary osteoarthritis " of both shoulders     DDD (degenerative disc disease), lumbar     Closed compression fracture of third lumbar vertebra, initial encounter        Plan:     Negro was seen today for low-back pain.    Diagnoses and all orders for this visit:    Lumbar spondylosis  -     MRI Lumbar Spine Without Contrast; Future    Primary osteoarthritis of both shoulders  -     MRI Lumbar Spine Without Contrast; Future    DDD (degenerative disc disease), lumbar  -     MRI Lumbar Spine Without Contrast; Future    Closed compression fracture of third lumbar vertebra, initial encounter  -     MRI Lumbar Spine Without Contrast; Future         Mr. Bhakta's pain is consistent with the above.    We discussed the assessment and recommendations.  All available images were reviewed. We discussed the disease process, prognosis, treatment plan, and risks and benefits. The patient is aware of the risks and benefits of the medications being prescribed, common side effects, and proper usage. The following is the plan we agreed on:     1. Lumbar XRAYs reviewed with patient.  Will order lumbar MRI to further evaluate chronicity of L3 compression fracture which I think is most likely remote.    2. Schedule for bilateral L2,3,4,5 MBBs.  The patient will call with relief and if diagnostic, we can schedule radiofrequency ablation of affected nerves, one side followed by the other 2 weeks apart.  3. I will review MRI prior to procedure.  4. Shoulder and knee pain is well controlled at this time.  5. RTC after completion of procedures.     The above plan and management options were discussed at length with patient. Patient is in agreement with the above and verbalized understanding.      WILLIAM Mendez  11/28/2018

## 2018-11-29 NOTE — PROGRESS NOTES
Patient, Negro Bhakta (MRN #[id number]), presented with a recent Platelet count less than 150 K/uL consistent with the definition of thrombocytopenia (ICD10 - D69.6).    Platelets   Date Value Ref Range Status   05/11/2018 143 (L) 150 - 350 K/uL Final     The patient's thrombocytopenia was monitored, evaluated, addressed and/or treated. This addendum to the medical record is made on 11/29/2018.

## 2018-11-30 ENCOUNTER — TELEPHONE (OUTPATIENT)
Dept: INTERNAL MEDICINE | Facility: CLINIC | Age: 83
End: 2018-11-30

## 2018-11-30 ENCOUNTER — HOSPITAL ENCOUNTER (OUTPATIENT)
Facility: OTHER | Age: 83
Discharge: HOME | End: 2018-11-30
Attending: ANESTHESIOLOGY | Admitting: ANESTHESIOLOGY
Payer: MEDICARE

## 2018-11-30 VITALS
OXYGEN SATURATION: 97 % | BODY MASS INDEX: 23.8 KG/M2 | SYSTOLIC BLOOD PRESSURE: 139 MMHG | DIASTOLIC BLOOD PRESSURE: 70 MMHG | WEIGHT: 170 LBS | HEIGHT: 71 IN | HEART RATE: 67 BPM | TEMPERATURE: 98 F | RESPIRATION RATE: 18 BRPM

## 2018-11-30 DIAGNOSIS — M47.816 LUMBAR SPONDYLOSIS: Primary | ICD-10-CM

## 2018-11-30 DIAGNOSIS — M47.819 OSTEOARTHRITIS OF SPINE WITHOUT MYELOPATHY OR RADICULOPATHY, UNSPECIFIED SPINAL REGION: ICD-10-CM

## 2018-11-30 PROCEDURE — 64494 INJ PARAVERT F JNT L/S 2 LEV: CPT | Mod: 50,HCNC,, | Performed by: ANESTHESIOLOGY

## 2018-11-30 PROCEDURE — 25000003 PHARM REV CODE 250: Mod: HCNC | Performed by: ANESTHESIOLOGY

## 2018-11-30 PROCEDURE — 64495 INJ PARAVERT F JNT L/S 3 LEV: CPT | Mod: 50,HCNC,, | Performed by: ANESTHESIOLOGY

## 2018-11-30 PROCEDURE — 64494 INJ PARAVERT F JNT L/S 2 LEV: CPT | Mod: 50,HCNC | Performed by: ANESTHESIOLOGY

## 2018-11-30 PROCEDURE — 64493 INJ PARAVERT F JNT L/S 1 LEV: CPT | Mod: 50,HCNC | Performed by: ANESTHESIOLOGY

## 2018-11-30 PROCEDURE — 64493 INJ PARAVERT F JNT L/S 1 LEV: CPT | Mod: 50,HCNC,, | Performed by: ANESTHESIOLOGY

## 2018-11-30 PROCEDURE — S0020 INJECTION, BUPIVICAINE HYDRO: HCPCS | Mod: HCNC | Performed by: ANESTHESIOLOGY

## 2018-11-30 PROCEDURE — 64495 INJ PARAVERT F JNT L/S 3 LEV: CPT | Mod: 50,HCNC | Performed by: ANESTHESIOLOGY

## 2018-11-30 RX ORDER — LIDOCAINE HYDROCHLORIDE 10 MG/ML
INJECTION INFILTRATION; PERINEURAL
Status: DISCONTINUED | OUTPATIENT
Start: 2018-11-30 | End: 2018-11-30 | Stop reason: HOSPADM

## 2018-11-30 RX ORDER — BUPIVACAINE HYDROCHLORIDE 5 MG/ML
INJECTION, SOLUTION EPIDURAL; INTRACAUDAL
Status: DISCONTINUED | OUTPATIENT
Start: 2018-11-30 | End: 2018-11-30 | Stop reason: HOSPADM

## 2018-11-30 NOTE — DISCHARGE INSTRUCTIONS
Thank you for allowing us to care for you today. You may receive a survey about the care we provided. Your feedback is valuable and helps us provide excellent care throughout the community.     Home Care Instructions for Pain Management:    1. DIET:   You may resume your normal diet today.   2. BATHING:   You may shower with luke warm water. No tub baths or anything that will soak injection sites under water for the next 24 hours.  3. DRESSING:   You may remove your bandage today.   4. ACTIVITY LEVEL:   You may resume your normal activities today. Do the activities that would normally cause you pain in order to assess if the block is working. You will be instructed to keep a pain diary and reports the results to the clinic.  5. MEDICATIONS:   You may resume your normal medications today. To restart blood thinners, ask your doctor.  6. DRIVING    If you have received any sedatives by mouth today, you may not drive for 12 hours.    If you have received any sedation through your IV, you may not drive for 24 hrs.   7. SPECIAL INSTRUCTIONS:   No heat to the injection site for 24 hrs including, hot bath or shower, heating pad, moist heat, or hot tubs.    Use ice pack to injection site for any pain or discomfort.  Apply ice packs for 20 minute intervals as needed.      If you are still having pain upon discharge:  Your pain may improve over the next 48 hours. The anesthetic (numbing medication) works immediately to 48 hours.    PLEASE CALL YOUR DOCTOR IF:  1. Redness or swelling around the injection site.  2. Fever of 101 degrees or more  3. Drainage (pus) from the injection site.  4. For any continuous bleeding (some dried blood over the incision is normal.)    FOR EMERGENCIES:   If any unusual problems or difficulties occur during clinic hours, call [phone] or 494.

## 2018-11-30 NOTE — OP NOTE
Date of Procedure: 11/30/2018    Procedure: Bilateral L2-5 Lumbar medial branch blocks    Pre-op diagnosis: Lumbar Spondylosis [M47.816]    Post-op diagnosis: Lumbar Spondylosis [M47.816]     Physician: Dr. Luz Haney     Assistant: Dr. Olivarez    Anesthestia: local    EBL: None    Specimens: None    All medications, allergies, and relevant histories were reviewed. No recent antibiotics or infections.  A time-out was taken to verify the correct patient, procedure, laterality, and appropriate medications/allergies.    Lumbar Medial Branch Block:   The procedure risks, benefits, and possible complications were discussed with the patient including nerve damage, spinal headache, bleeding, infection, and failure of pain relief.   Patient was placed in the prone position with the midriff elevated. Oblique view of the spine was obtained with fluoroscopy. Entry sites marked over the skin. The skin was prepped with chlorhexidine x3 and draped. Sterile precautions observed throughout the procedure. Xylocaine 1% was infiltrated locally over the entry site. A 22-gauge spinal needle was introduced at an angle, and the needle was placed onto the junction of the superior articular process and the most supermedial point on the transverse process. Placement was confirmed with a fluoroscopic view. After negative aspiration, 1cc of bupivacaine 0.5% was injected at each level. Needle was restyletted and removed. Procedure performed at the levels indicated: Right L2-5, Left:L2-5.     Patient tolerated the procedure well and there were no complications.   The patient was discharged home with a responsible adult.    I certify that I provided the above services.  I was present for the entire procedure, which was performed by myself with the assistance of the resident physician.  There were no parts of the procedure that were performed not by myself or without my direct supervision.    Future Management:   If good results, can proceed to  RFA.  If no relief, can follow up to discuss options.    I certify that I provided the above services.  I was present for the entire procedure, which was performed by myself with the assistance of the resident physician.  There were no parts of the procedure that were performed not by myself or without my direct supervision.

## 2018-12-03 ENCOUNTER — TELEPHONE (OUTPATIENT)
Dept: PAIN MEDICINE | Facility: CLINIC | Age: 83
End: 2018-12-03

## 2018-12-03 NOTE — TELEPHONE ENCOUNTER
Bar[place] this is [place] I've received your request I'm returning your call from the pain management clinic at [facility]. I just wanted to let you know that I'm working on getting an answer for you by the time you contact our clinic. Pt need to report over all percentage of relief on a scale 1-100.

## 2018-12-03 NOTE — TELEPHONE ENCOUNTER
----- Message from Kailey Iglesias sent at 12/3/2018 11:21 AM CST -----  Contact: pt            Name of Who is Calling:LEE PRAKASH [[id number]]    What is the request in detail: pt pain report  12:30 driving  1:30 pm 3  2:30 pm  3  3:30 pm 2  9:30 pm 1    Saturday   9:30am 1       Can the clinic reply by [facility]: no      What Number to [facility] Back [facility]:[phone]

## 2018-12-03 NOTE — TELEPHONE ENCOUNTER
----- Message from Krista Floyd sent at 12/3/2018 12:32 PM CST -----  Contact: pt            Name of Who is Calling: Negro      What is the request in detail: pt reports 90% of relief all round. Pt would like to get a call back because he has some questions.Please call and advise      Can the clinic reply by [facility]: no      What Number [facility] Back [facility]: [phone]

## 2018-12-12 ENCOUNTER — HOSPITAL ENCOUNTER (OUTPATIENT)
Dept: RADIOLOGY | Facility: HOSPITAL | Age: 83
Discharge: HOME | End: 2018-12-12
Attending: NURSE PRACTITIONER
Payer: MEDICARE

## 2018-12-12 DIAGNOSIS — M47.816 LUMBAR SPONDYLOSIS: ICD-10-CM

## 2018-12-12 DIAGNOSIS — S32.030A CLOSED COMPRESSION FRACTURE OF THIRD LUMBAR VERTEBRA, INITIAL ENCOUNTER: ICD-10-CM

## 2018-12-12 DIAGNOSIS — M19.011 PRIMARY OSTEOARTHRITIS OF BOTH SHOULDERS: ICD-10-CM

## 2018-12-12 DIAGNOSIS — M51.36 DDD (DEGENERATIVE DISC DISEASE), LUMBAR: ICD-10-CM

## 2018-12-12 DIAGNOSIS — M19.012 PRIMARY OSTEOARTHRITIS OF BOTH SHOULDERS: ICD-10-CM

## 2018-12-12 PROCEDURE — 72148 MRI LUMBAR SPINE W/O DYE: CPT | Mod: 26,HCWC,, | Performed by: RADIOLOGY

## 2018-12-12 PROCEDURE — 72148 MRI LUMBAR SPINE W/O DYE: CPT | Mod: TC,HCWC

## 2018-12-14 ENCOUNTER — TELEPHONE (OUTPATIENT)
Dept: PAIN MEDICINE | Facility: CLINIC | Age: 83
End: 2018-12-14

## 2018-12-14 DIAGNOSIS — M47.816 LUMBAR SPONDYLOSIS: Primary | ICD-10-CM

## 2018-12-14 NOTE — TELEPHONE ENCOUNTER
"----- Message from Luz Haney MD sent at 12/13/2018  7:03 AM CST -----  Regarding: RE: MRI- fracture  I'd say let's move forward with the RFA.  If the compression fracture is old, it may not be painful anymore.    Karla, please put in for an RFA with Brett Venom on the side of his choice, followed by the other side 1-2 weeks later with IV sedation, 30 min, with 3 week follow up after the second one with Dorothea.    Thanks!  LW    ----- Message -----  From: WILLIAM Kaminski  Sent: 12/12/2018   1:52 PM  To: Luz Haney MD  Subject: MRI- fracture                                    I ordered a lumbar MRI which shows "concave deformity of the superior endplate of L3, similar to that seen on previous CT scan of 05/10/2018.  Findings are suspicious for a Schmorl's node with some acute features present given some edema signal in the superior endplate of L3."  He had 90% relief from MBB.  When I evaluated him he was more symptomatic for the arthritis. What do you think we should do?    "

## 2018-12-17 ENCOUNTER — TELEPHONE (OUTPATIENT)
Dept: ADMINISTRATIVE | Facility: OTHER | Age: 83
End: 2018-12-17

## 2019-01-04 ENCOUNTER — HOSPITAL ENCOUNTER (OUTPATIENT)
Facility: OTHER | Age: 84
Discharge: HOME | End: 2019-01-04
Attending: ANESTHESIOLOGY | Admitting: ANESTHESIOLOGY
Payer: MEDICARE

## 2019-01-04 VITALS
HEART RATE: 64 BPM | HEIGHT: 71 IN | OXYGEN SATURATION: 98 % | TEMPERATURE: 98 F | RESPIRATION RATE: 18 BRPM | SYSTOLIC BLOOD PRESSURE: 128 MMHG | DIASTOLIC BLOOD PRESSURE: 91 MMHG | BODY MASS INDEX: 23.8 KG/M2 | WEIGHT: 170 LBS

## 2019-01-04 DIAGNOSIS — M47.816 LUMBAR SPONDYLOSIS: Primary | ICD-10-CM

## 2019-01-04 PROCEDURE — 64636 PR DESTROY L/S FACET JNT ADDL: ICD-10-PCS | Mod: HCWC,RT,, | Performed by: ANESTHESIOLOGY

## 2019-01-04 PROCEDURE — 25000003 PHARM REV CODE 250: Mod: HCNC | Performed by: ANESTHESIOLOGY

## 2019-01-04 PROCEDURE — 64635 PR DESTROY LUMB/SAC FACET JNT: ICD-10-PCS | Mod: HCWC,RT,, | Performed by: ANESTHESIOLOGY

## 2019-01-04 PROCEDURE — 63600175 PHARM REV CODE 636 W HCPCS: Mod: HCNC | Performed by: ANESTHESIOLOGY

## 2019-01-04 PROCEDURE — 99152 MOD SED SAME PHYS/QHP 5/>YRS: CPT | Mod: HCWC,,, | Performed by: ANESTHESIOLOGY

## 2019-01-04 PROCEDURE — 25500020 PHARM REV CODE 255: Mod: HCNC | Performed by: ANESTHESIOLOGY

## 2019-01-04 PROCEDURE — 99152 PR MOD CONSCIOUS SEDATION, SAME PHYS, 5+ YRS, FIRST 15 MIN: ICD-10-PCS | Mod: HCWC,,, | Performed by: ANESTHESIOLOGY

## 2019-01-04 PROCEDURE — 64635 DESTROY LUMB/SAC FACET JNT: CPT | Mod: HCNC | Performed by: ANESTHESIOLOGY

## 2019-01-04 PROCEDURE — 64635 DESTROY LUMB/SAC FACET JNT: CPT | Mod: HCWC,RT,, | Performed by: ANESTHESIOLOGY

## 2019-01-04 PROCEDURE — S0020 INJECTION, BUPIVICAINE HYDRO: HCPCS | Mod: HCNC | Performed by: ANESTHESIOLOGY

## 2019-01-04 PROCEDURE — 64636 DESTROY L/S FACET JNT ADDL: CPT | Mod: HCWC,RT,, | Performed by: ANESTHESIOLOGY

## 2019-01-04 PROCEDURE — 64636 DESTROY L/S FACET JNT ADDL: CPT | Mod: HCNC | Performed by: ANESTHESIOLOGY

## 2019-01-04 RX ORDER — FENTANYL CITRATE 50 UG/ML
INJECTION, SOLUTION INTRAMUSCULAR; INTRAVENOUS
Status: DISCONTINUED | OUTPATIENT
Start: 2019-01-04 | End: 2019-01-04 | Stop reason: HOSPADM

## 2019-01-04 RX ORDER — BUPIVACAINE HYDROCHLORIDE 5 MG/ML
INJECTION, SOLUTION EPIDURAL; INTRACAUDAL
Status: DISCONTINUED | OUTPATIENT
Start: 2019-01-04 | End: 2019-01-04 | Stop reason: HOSPADM

## 2019-01-04 RX ORDER — LIDOCAINE HYDROCHLORIDE 20 MG/ML
INJECTION, SOLUTION INFILTRATION; PERINEURAL
Status: DISCONTINUED | OUTPATIENT
Start: 2019-01-04 | End: 2019-01-04 | Stop reason: HOSPADM

## 2019-01-04 RX ORDER — SODIUM CHLORIDE 9 MG/ML
INJECTION, SOLUTION INTRAVENOUS CONTINUOUS
Status: DISCONTINUED | OUTPATIENT
Start: 2019-01-04 | End: 2019-01-04 | Stop reason: HOSPADM

## 2019-01-04 RX ORDER — MIDAZOLAM HYDROCHLORIDE 1 MG/ML
INJECTION INTRAMUSCULAR; INTRAVENOUS
Status: DISCONTINUED | OUTPATIENT
Start: 2019-01-04 | End: 2019-01-04 | Stop reason: HOSPADM

## 2019-01-04 RX ORDER — DEXAMETHASONE SODIUM PHOSPHATE 4 MG/ML
INJECTION, SOLUTION INTRA-ARTICULAR; INTRALESIONAL; INTRAMUSCULAR; INTRAVENOUS; SOFT TISSUE
Status: DISCONTINUED | OUTPATIENT
Start: 2019-01-04 | End: 2019-01-04 | Stop reason: HOSPADM

## 2019-01-04 RX ADMIN — SODIUM CHLORIDE: 0.9 INJECTION, SOLUTION INTRAVENOUS at 09:01

## 2019-01-04 NOTE — H&P (VIEW-ONLY)
"[facility]  Negro Bhakta is a 85 y.o. male presenting for planned procedure of a right sided RFA ([place] Venom) at the L2, L3, L4, and L5 medial branches. He denies any changes in his health since he was last seen on 11/28/18. Denies any fevers, chills or concern for infection.     PMHx, PSHx, Allergies, Medications reviewed in epic    ROS negative except pain complaints in [facility]    OBJECTIVE:    BP (!) 150/87 (BP Location: Right arm, Patient Position: Lying)   Pulse 66   Temp 97.8 °F (36.6 °C) (Oral)   Resp 18   Ht 5' 11" (1.803 m)   Wt 77.1 kg (170 lb)   SpO2 98%   BMI 23.71 kg/m²     PHYSICAL EXAMINATION:    GENERAL: Well appearing, in no acute distress, alert and oriented x3.  PSYCH:  Mood and affect appropriate.  SKIN: Skin color, texture, turgor normal, no rashes or lesions.  CV: RRR with palpation of the radial artery.  PULM: No evidence of respiratory difficulty, symmetric chest rise. Clear to auscultation.  NEURO: Cranial nerves grossly intact.    Plan:    Proceed with procedure as planned (right sided RFA (Brett Venom) at the L2, L3, L4, and L5 medial branches)      Left side at the same levels is scheduled for 1/18/19.     Farrukh Boateng  01/04/2019    I have seen the patient with the fellow physician.  We have come up with the above plan.  The patient is in agreement with our plan.    "

## 2019-01-04 NOTE — OP NOTE
"Date of Procedure: 01/04/2019    Procedure: Right L2-5 Lumbar Medial Branch Nerve Thermal Radiofrequency Ablation    Pre-op diagnosis: Lumbar Spondylosis [M47.816]    Post-op diagnosis: Lumbar Spondylosis [M47.816]     Physician: Dr. Luz Haney     Assistant: Dr. Boateng/Sherrill    Anesthestia: local/IV sedation:  Versed 1 mg and fentanyl 75 mcg IV.  Conscious sedation provided by MD and monitored by RN.  Total sedation time was less than 45 minutes. (See nurse documentation and case log for sedation time)    EBL: None    Specimens: None    All medications, allergies, and relevant histories were reviewed. No recent antibiotics or infections.  A time-out was taken to verify the correct patient, procedure, laterality, and appropriate medications/allergies.    Procedure: Lumbar RFA    Lumbar Medial Branch Block with radiofrequency ablation, levels L2-5     The procedure risks, benefits, and possible complications were discussed with the patient including nerve damage, infection, spinal headache, and paresis.   Patient was placed in the prone position with the midriff elevated. Skin was prepped with CHG and draped. Oblique view of the spine was obtained with fluoroscopy. Entry sites were marked over the skin and Xylocaine 1% was used to anesthetize the skin and subcutaneous tissues.     A 3.5 " Brett Venom needle was introduced at an angle to parallel the medial branches in the groove between superior articular process and transverse process, and L5 primary dorsal ramus at the junction of the S1 superior articular process and sacral ala.    Multifidus stim elicited at each level.  No distal motor stimulation was elicited at any level at 2V with a frequency of 2Hz.  All impedances were within the acceptable range.    1cc of 2% lidocaine was injected at each level.  Thermal RF was then conducted at each level at 80 degrees, for 2:30 minutes   1 cc of a mixture of 0.5% bupivacaine with dexamethasone 5 mg was injected at " each level.    Patient tolerated the procedure well and there were no complications.      Future Management:   If helpful, can repeat as needed.  Follow up with me in 4-6 weeks.      I certify that I provided the above services.  I was present for the entire procedure, which was performed by the fellow and resident physicians under my supervision.  There were no parts of the procedure that were performed not by myself or without my direct supervision.

## 2019-01-04 NOTE — DISCHARGE INSTRUCTIONS
Thank you for allowing us to care for you today. You may receive a survey about the care we provided. Your feedback is valuable and helps us provide excellent care throughout the community.     Home Care Instructions for Pain Management:    1. DIET:   You may resume your normal diet today.   2. BATHING:   You may shower with luke warm water. No tub baths or anything that will soak injection sites under water for the next 24 hours.  3. DRESSING:   You may remove your bandage today.   4. ACTIVITY LEVEL:   You may resume your normal activities 24 hrs after your procedure. Nothing strenuous today.  5. MEDICATIONS:   You may resume your normal medications today. To restart blood thinners, ask your doctor.  6. DRIVING    If you have received any sedatives by mouth today, you may not drive for 12 hours.    If you have received any sedation through your IV, you may not drive for 24 hrs.   7. SPECIAL INSTRUCTIONS:   No heat to the injection site for 24 hrs including, hot bath or shower, heating pad, moist heat, or hot tubs.    Use ice pack to injection site for any pain or discomfort.  Apply ice packs for 20 minute intervals as needed.    IF you have diabetes, be sure to monitor your blood sugar more closely. IF your injection contained steroids your blood sugar levels may become higher than normal.    If you are still having pain upon discharge:  Your pain may improve over the next 48 hours. The anesthetic (numbing medication) works immediately to 48 hours. IF your injection contained a steroid (anti-inflammatory medication), it takes approximately 3 days to start feeling relief and 7-10 days to see your greatest results from the medication. It is possible you may need subsequent injections. This would be discussed at your follow up appointment with pain management or your referring doctor.      PLEASE CALL YOUR DOCTOR IF:  1. Redness or swelling around the injection site.  2. Fever of 101 degrees or more  3. Drainage  (pus) from the injection site.  4. For any continuous bleeding (some dried blood over the incision is normal.)    FOR EMERGENCIES:   If any unusual problems or difficulties occur during clinic hours, call [phone] or [phone]. Adult Procedural Sedation Instructions    Recovery After Procedural Sedation (Adult)  You have been given medicine by vein to make you sleep during your surgery. This may have included both a pain medicine and sleeping medicine. Most of the effects have worn off. But you may still have some drowsiness for the next 6 to 8 hours.  Home care  Follow these guidelines when you get home:  · For the next 8 hours, you should be watched by a responsible adult. This person should make sure your condition is not getting worse.  · Don't drink any alcohol for the next 24 hours.  · Don't drive, operate dangerous machinery, or make important business or personal decisions during the next 24 hours.  Note: Your healthcare provider may tell you not to take any medicine by mouth for pain or sleep in the next 4 hours. These medicines may react with the medicines you were given in the hospital. This could cause a much stronger response than usual.  Follow-up care  Follow up with your healthcare provider if you are not alert and back to your usual level of activity within 12 hours.  When to seek medical advice  Call your healthcare provider right away if any of these occur:  · Drowsiness gets worse  · Weakness or dizziness gets worse  · Repeated vomiting  · You can't be awakened   Date Last Reviewed: 10/18/2016  © 4817-8517 [employer]. [address] [place] [address], [place] [address]. All rights reserved. This information is not intended as a substitute for professional medical care. Always follow your healthcare professional's instructions.

## 2019-01-04 NOTE — H&P
"[facility]  Negro Bhakta is a 85 y.o. male presenting for planned procedure of a right sided RFA ([place] Venom) at the L2, L3, L4, and L5 medial branches. He denies any changes in his health since he was last seen on 11/28/18. Denies any fevers, chills or concern for infection.     PMHx, PSHx, Allergies, Medications reviewed in epic    ROS negative except pain complaints in [facility]    OBJECTIVE:    BP (!) 150/87 (BP Location: Right arm, Patient Position: Lying)   Pulse 66   Temp 97.8 °F (36.6 °C) (Oral)   Resp 18   Ht 5' 11" (1.803 m)   Wt 77.1 kg (170 lb)   SpO2 98%   BMI 23.71 kg/m²     PHYSICAL EXAMINATION:    GENERAL: Well appearing, in no acute distress, alert and oriented x3.  PSYCH:  Mood and affect appropriate.  SKIN: Skin color, texture, turgor normal, no rashes or lesions.  CV: RRR with palpation of the radial artery.  PULM: No evidence of respiratory difficulty, symmetric chest rise. Clear to auscultation.  NEURO: Cranial nerves grossly intact.    Plan:    Proceed with procedure as planned (right sided RFA (Brett Venom) at the L2, L3, L4, and L5 medial branches)      Left side at the same levels is scheduled for 1/18/19.     Farrukh Boateng  01/04/2019    I have seen the patient with the fellow physician.  We have come up with the above plan.  The patient is in agreement with our plan.    "

## 2019-01-04 NOTE — DISCHARGE SUMMARY
Discharge Note  Short Stay      SUMMARY     Admit Date: 1/4/2019    Attending Physician: Luz Haney    Procedure: Right L2-5 Lumbar Medial Branch Nerve Thermal Radiofrequency Ablation    Discharge Physician: Luz Haney    Discharge Date: 1/4/2019 10:50 AM    Final Diagnosis: Lumbar spondylosis [M47.816]    Disposition: Home or self care    Patient Instructions:   Current Discharge Medication List      CONTINUE these medications which have NOT CHANGED    Details   latanoprost 0.005 % ophthalmic solution Place 1 drop into the right eye every evening.  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         STOP taking these medications       warfarin (COUMADIN) 2.5 MG tablet Comments:   Reason for Stopping:               Resume home diet and activity

## 2019-01-07 ENCOUNTER — OFFICE VISIT (OUTPATIENT)
Dept: OPTOMETRY | Facility: CLINIC | Age: 84
End: 2019-01-07
Payer: COMMERCIAL

## 2019-01-07 DIAGNOSIS — H26.9 CORTICAL CATARACT OF BOTH EYES: ICD-10-CM

## 2019-01-07 DIAGNOSIS — H40.053 OCULAR HYPERTENSION, BILATERAL: Primary | ICD-10-CM

## 2019-01-07 DIAGNOSIS — H53.40 VISUAL FIELD DEFECT: ICD-10-CM

## 2019-01-07 DIAGNOSIS — H25.13 NUCLEAR SCLEROSIS, BILATERAL: ICD-10-CM

## 2019-01-07 DIAGNOSIS — H52.4 PRESBYOPIA OF BOTH EYES: ICD-10-CM

## 2019-01-07 DIAGNOSIS — H52.223 REGULAR ASTIGMATISM OF BOTH EYES: ICD-10-CM

## 2019-01-07 PROCEDURE — 92014 PR EYE EXAM, EST PATIENT,COMPREHESV: ICD-10-PCS | Mod: S$GLB,,, | Performed by: OPTOMETRIST

## 2019-01-07 PROCEDURE — 99999 PR PBB SHADOW E&M-EST. PATIENT-LVL III: ICD-10-PCS | Mod: PBBFAC,,, | Performed by: OPTOMETRIST

## 2019-01-07 PROCEDURE — 92015 PR REFRACTION: ICD-10-PCS | Mod: S$GLB,,, | Performed by: OPTOMETRIST

## 2019-01-07 PROCEDURE — 92015 DETERMINE REFRACTIVE STATE: CPT | Mod: S$GLB,,, | Performed by: OPTOMETRIST

## 2019-01-07 PROCEDURE — 92014 COMPRE OPH EXAM EST PT 1/>: CPT | Mod: S$GLB,,, | Performed by: OPTOMETRIST

## 2019-01-07 PROCEDURE — 99999 PR PBB SHADOW E&M-EST. PATIENT-LVL III: CPT | Mod: PBBFAC,,, | Performed by: OPTOMETRIST

## 2019-01-07 NOTE — PROGRESS NOTES
"HPI     Ocular Hypertension      Additional comments: General eye examination.  Prior diagnosis of ocular hypertension in OD.  Using drops in OD only for IOP control  Notes some apparent decrease in distance VA - but "not all the time" (not   diabetic).               Comments     Patient's age: 85 y.o. WM  Occupation: Retired  Approximate date of last eye examination:  06/07/2018  Name of last eye doctor seen: Dr. Riggins  City/State: [facility]  Wears glasses? Yes     If yes, wears  Full-time or part-time?  Full-Time  Present glasses are: Bifocal, SV Distance, SV Reading?  Lined Bifocal  Approximate age of present glasses:  Several years old  Got new glasses following last exam, or subsequently?:  No   Any problem with VA with glasses?  No  Wears CLs?:  No  Headaches?  No  Eye pain/discomfort?  No                                                                                     Flashes?  No  Floaters?  No  Diplopia/Double vision?  No  Patient's Ocular History:         Any eye surgeries? No         Any eye injury?  No         Any treatment for eye disease?  Ocular Hypertension  Family history of eye disease?  Sister with eye cancer and enucleation  Significant patient medical history:         1. Diabetes?  No   2. HBP?  No              3. Other (describe):     ! OTC eyedrops currently using:  No   ! Prescription eye meds currently using:  Latanoprost QHS OD Only    ! Any history of allergy/adverse reaction to any eye meds used   previously?  No   ! Any history of allergy/adverse reaction to eyedrops used during prior   eye exam(s)? No   ! Any history of allergy/adverse reaction to Novacaine or similar meds?   No   ! Any history of allergy/adverse reaction to Epinephrine or similar meds?   No    ! Patient okay with use of anesthetic eyedrops to check eye pressure?    Yes       ! Patient okay with use of eyedrops to dilate pupils today?  Yes   !  Allergies/Medications/Medical History/Family History reviewed today?  " "  Yes      PD =   61/57  Desired reading distance =  18"                                                                           Last edited by Yair Riggins, OD on 1/7/2019  2:13 PM. (History)            Assessment /Plan     For exam results, see Encounter Report.    1. Ocular hypertension, bilateral  Cruz Visual Field - OU - Extended - Both Eyes   2. Visual field defect  Cruz Visual Field - OU - Extended - Both Eyes   3. Nuclear sclerosis, bilateral     4. Cortical cataract of both eyes     5. Regular astigmatism of both eyes     6. Presbyopia of both eyes                  Best corrected VA with last refraction essentially stable in each eye.    Presbyopia consistent with age.     Prior diagnosis of ocular hypertension in the right eye, with visual field changes suggestive of early inferior acuate defect in the right eye on last HVF test done.   Currently using Latanoprost 0.005% ophthalmic solution in the right eye (only) every evening for IOP control/reduction.  IOP today high-normal in the right eye (with drops), and high-normal/borderline in the left eye (without drops)   HVF test (24-2 EUNICE Standard) done at last visit.  Suggest repeat HVF, and follow up visit with me (Dr. Riggins) in six months.   Will send reminder in mail.       Astigmatic refractive error in each eye, and presbyopia consistent with age.  New spectacle lens Rx issued.   Recommend continue to wear glasses full-time.      Continue Latanoprost 0.005% ophthalmic solution every evening in the right eye only.  Return in July 2019 for recheck, with HVF test (24-2) at that time.                "

## 2019-01-07 NOTE — PATIENT INSTRUCTIONS
Piror diagnosis of bilateral nuclear sclerotic/cortical cataract.   Astigmatic refractive error in each eye.    Best corrected VA with last refraction essentially stable in each eye.    Presbyopia consistent with age.     Prior diagnosis of ocular hypertension in the right eye, with visual field changes suggestive of early inferior acuate defect in the right eye on last HVF test done.   Currently using Latanoprost 0.005% ophthalmic solution in the right eye (only) every evening for IOP control/reduction.  IOP today high-normal in the right eye (with drops), and high-normal/borderline in the left eye (without drops)   HVF test (24-2 EUNICE Standard) done at last visit.  Suggest repeat HVF, and follow up visit with me (Dr. Riggins) in six months.   Will send reminder in mail.       Astigmatic refractive error in each eye, and presbyopia consistent with age.  New spectacle lens Rx issued.   Recommend continue to wear glasses full-time.      Continue Latanoprost 0.005% ophthalmic solution every evening in the right eye only.  Return in July 2019 for recheck, with HVF test (24-2) at that time.

## 2019-01-18 ENCOUNTER — HOSPITAL ENCOUNTER (OUTPATIENT)
Facility: OTHER | Age: 84
Discharge: HOME | End: 2019-01-18
Attending: ANESTHESIOLOGY | Admitting: ANESTHESIOLOGY
Payer: MEDICARE

## 2019-01-18 VITALS
HEART RATE: 65 BPM | SYSTOLIC BLOOD PRESSURE: 136 MMHG | DIASTOLIC BLOOD PRESSURE: 65 MMHG | HEIGHT: 71 IN | OXYGEN SATURATION: 98 % | BODY MASS INDEX: 23.8 KG/M2 | TEMPERATURE: 98 F | WEIGHT: 170 LBS | RESPIRATION RATE: 18 BRPM

## 2019-01-18 DIAGNOSIS — M47.816 LUMBAR SPONDYLOSIS: Primary | ICD-10-CM

## 2019-01-18 PROCEDURE — 64636 PR DESTROY L/S FACET JNT ADDL: ICD-10-PCS | Mod: HCWC,LT,, | Performed by: ANESTHESIOLOGY

## 2019-01-18 PROCEDURE — 63600175 PHARM REV CODE 636 W HCPCS: Mod: HCNC | Performed by: ANESTHESIOLOGY

## 2019-01-18 PROCEDURE — 25000003 PHARM REV CODE 250: Mod: HCNC | Performed by: ANESTHESIOLOGY

## 2019-01-18 PROCEDURE — S0020 INJECTION, BUPIVICAINE HYDRO: HCPCS | Mod: HCNC | Performed by: ANESTHESIOLOGY

## 2019-01-18 PROCEDURE — 64635 DESTROY LUMB/SAC FACET JNT: CPT | Mod: HCWC,LT,, | Performed by: ANESTHESIOLOGY

## 2019-01-18 PROCEDURE — 99152 PR MOD CONSCIOUS SEDATION, SAME PHYS, 5+ YRS, FIRST 15 MIN: ICD-10-PCS | Mod: HCWC,,, | Performed by: ANESTHESIOLOGY

## 2019-01-18 PROCEDURE — 99152 MOD SED SAME PHYS/QHP 5/>YRS: CPT | Mod: HCWC,,, | Performed by: ANESTHESIOLOGY

## 2019-01-18 PROCEDURE — 64635 PR DESTROY LUMB/SAC FACET JNT: ICD-10-PCS | Mod: HCWC,LT,, | Performed by: ANESTHESIOLOGY

## 2019-01-18 PROCEDURE — 64635 DESTROY LUMB/SAC FACET JNT: CPT | Mod: HCNC | Performed by: ANESTHESIOLOGY

## 2019-01-18 PROCEDURE — 64636 DESTROY L/S FACET JNT ADDL: CPT | Mod: HCWC,LT,, | Performed by: ANESTHESIOLOGY

## 2019-01-18 PROCEDURE — 64636 DESTROY L/S FACET JNT ADDL: CPT | Mod: HCNC | Performed by: ANESTHESIOLOGY

## 2019-01-18 RX ORDER — DEXAMETHASONE SODIUM PHOSPHATE 4 MG/ML
INJECTION, SOLUTION INTRA-ARTICULAR; INTRALESIONAL; INTRAMUSCULAR; INTRAVENOUS; SOFT TISSUE
Status: DISCONTINUED | OUTPATIENT
Start: 2019-01-18 | End: 2019-01-18 | Stop reason: HOSPADM

## 2019-01-18 RX ORDER — FENTANYL CITRATE 50 UG/ML
INJECTION, SOLUTION INTRAMUSCULAR; INTRAVENOUS
Status: DISCONTINUED | OUTPATIENT
Start: 2019-01-18 | End: 2019-01-18 | Stop reason: HOSPADM

## 2019-01-18 RX ORDER — LIDOCAINE HYDROCHLORIDE 20 MG/ML
INJECTION, SOLUTION INFILTRATION; PERINEURAL
Status: DISCONTINUED | OUTPATIENT
Start: 2019-01-18 | End: 2019-01-18 | Stop reason: HOSPADM

## 2019-01-18 RX ORDER — BUPIVACAINE HYDROCHLORIDE 5 MG/ML
INJECTION, SOLUTION EPIDURAL; INTRACAUDAL
Status: DISCONTINUED | OUTPATIENT
Start: 2019-01-18 | End: 2019-01-18 | Stop reason: HOSPADM

## 2019-01-18 RX ORDER — SODIUM CHLORIDE 9 MG/ML
INJECTION, SOLUTION INTRAVENOUS CONTINUOUS
Status: DISCONTINUED | OUTPATIENT
Start: 2019-01-18 | End: 2019-01-18 | Stop reason: HOSPADM

## 2019-01-18 RX ORDER — MIDAZOLAM HYDROCHLORIDE 1 MG/ML
INJECTION INTRAMUSCULAR; INTRAVENOUS
Status: DISCONTINUED | OUTPATIENT
Start: 2019-01-18 | End: 2019-01-18 | Stop reason: HOSPADM

## 2019-01-18 RX ADMIN — SODIUM CHLORIDE: 0.9 INJECTION, SOLUTION INTRAVENOUS at 09:01

## 2019-01-18 NOTE — INTERVAL H&P NOTE
The patient has been examined and the H&P has been reviewed:    I concur with the findings and no changes have occurred since H&P was written.     No change in the location or quality of the pain since the most recent clinic visit.  No new symptoms.  He wishes to proceed with the procedure today.    PE, unchanged from previous:  CV:  RRR  Resp: unlabored, no wheezing.    NPO since MN.      Anesthesia/Surgery risks, benefits and alternative options discussed and understood by patient/family.          Active Hospital Problems    Diagnosis  POA    Lumbar spondylosis [M47.816]  Yes      Resolved Hospital Problems   No resolved problems to display.     I have seen the patient with the resident physician.  We have come up with the above plan.  The patient is in agreement with our plan.

## 2019-01-18 NOTE — OP NOTE
"Date of Procedure: 01/18/2019    Procedure: Left L2-5 Lumbar Medial Branch Nerve Thermal Radiofrequency Ablation    Pre-op diagnosis: Lumbar Spondylosis [M47.816]    Post-op diagnosis: Lumbar Spondylosis [M47.816]     Physician: Dr. Luz Haney     Assistant: Dr. Mccartney    Anesthestia: local/IV sedation:  Versed 1 mg and fentanyl 50 mcg IV.  Conscious sedation provided by MD and monitored by RN.  Total sedation time was less than 45 minutes. (See nurse documentation and case log for sedation time)    EBL: None    Specimens: None    All medications, allergies, and relevant histories were reviewed. No recent antibiotics or infections.  A time-out was taken to verify the correct patient, procedure, laterality, and appropriate medications/allergies.    Procedure: Lumbar RFA    Lumbar Medial Branch Block with radiofrequency ablation, levels Lleft L2-5     The procedure risks, benefits, and possible complications were discussed with the patient including nerve damage, infection, spinal headache, and paresis.   Patient was placed in the prone position with the midriff elevated. Skin was prepped with CHG and draped. Oblique view of the spine was obtained with fluoroscopy. Entry sites were marked over the skin and Xylocaine 1% was used to anesthetize the skin and subcutaneous tissues.     A 3.5 " [place] Venom needle was introduced at an angle to parallel the medial branches in the groove between superior articular process and transverse process, and L5 primary dorsal ramus at the junction of the S1 superior articular process and sacral ala.    Multifidus stim elicited at each level.  No distal motor stimulation was elicited at any level at 2V with a frequency of 2Hz.  All impedances were within the acceptable range.    1cc of 2% lidocaine was injected at each level.  Thermal RF was then conducted at each level at 80 degrees, for 2:30 minutes   1 cc of a mixture of 0.5% bupivacaine with dexamethasone 5 mg was injected at " each level.    Patient tolerated the procedure well and there were no complications.      Future Management:   If helpful, can repeat as needed.  Follow up with me in 4-6 weeks.      I certify that I provided the above services.  I was present for the entire procedure, which was performed by myself with the assistance of the resident physician.  There were no parts of the procedure that were performed not by myself or without my direct supervision.

## 2019-01-18 NOTE — DISCHARGE SUMMARY
Discharge Note  Short Stay      SUMMARY     Admit Date: 1/18/2019    Attending Physician: Luz Haney    Procedure: Left L2-5 Lumbar Medial Branch Nerve Thermal Radiofrequency Ablation    Discharge Physician: Luz Haney    Discharge Date: 1/18/2019 3:00 PM    Final Diagnosis: Lumbar spondylosis [M47.816]    Disposition: Home or self care    Patient Instructions:   Discharge Medication List as of 1/18/2019 10:54 AM      CONTINUE these medications which have NOT CHANGED    Details   latanoprost 0.005 % ophthalmic solution Place 1 drop into the right eye every evening., Starting 8/15/2016, Until Discontinued, Normal      multivitamin (ONE DAILY MULTIVITAMIN) per tablet Take 1 tablet by mouth once daily., Until Discontinued, Historical Med         STOP taking these medications       warfarin (COUMADIN) 2.5 MG tablet Comments:   Reason for Stopping:               Resume home diet and activity

## 2019-01-18 NOTE — DISCHARGE INSTRUCTIONS
Adult Procedural Sedation Instructions    Recovery After Procedural Sedation (Adult)  You have been given medicine by vein to make you sleep during your surgery. This may have included both a pain medicine and sleeping medicine. Most of the effects have worn off. But you may still have some drowsiness for the next 6 to 8 hours.  Home care  Follow these guidelines when you get home:  · For the next 8 hours, you should be watched by a responsible adult. This person should make sure your condition is not getting worse.  · Don't drink any alcohol for the next 24 hours.  · Don't drive, operate dangerous machinery, or make important business or personal decisions during the next 24 hours.  Note: Your healthcare provider may tell you not to take any medicine by mouth for pain or sleep in the next 4 hours. These medicines may react with the medicines you were given in the hospital. This could cause a much stronger response than usual.  Follow-up care  Follow up with your healthcare provider if you are not alert and back to your usual level of activity within 12 hours.  When to seek medical advice  Call your healthcare provider right away if any of these occur:  · Drowsiness gets worse  · Weakness or dizziness gets worse  · Repeated vomiting  · You can't be awakened   Date Last Reviewed: 10/18/2016  © 5959-7128 [employer]. [address] [place] [address], [place] [address]. All rights reserved. This information is not intended as a substitute for professional medical care. Always follow your healthcare professional's instructions.       Thank you for allowing us to care for you today. You may receive a survey about the care we provided. Your feedback is valuable and helps us provide excellent care throughout the community.     Home Care Instructions for Pain Management:    1. DIET:   You may resume your normal diet today.   2. BATHING:   You may shower with luke warm water. No tub baths or anything that will soak  injection sites under water for the next 24 hours.  3. DRESSING:   You may remove your bandage today.   4. ACTIVITY LEVEL:   You may resume your normal activities 24 hrs after your procedure. Nothing strenuous today.  5. MEDICATIONS:   You may resume your normal medications today. To restart blood thinners, ask your doctor.  6. DRIVING    If you have received any sedatives by mouth today, you may not drive for 12 hours.    If you have received any sedation through your IV, you may not drive for 24 hrs.   7. SPECIAL INSTRUCTIONS:   No heat to the injection site for 24 hrs including, hot bath or shower, heating pad, moist heat, or hot tubs.    Use ice pack to injection site for any pain or discomfort.  Apply ice packs for 20 minute intervals as needed.    IF you have diabetes, be sure to monitor your blood sugar more closely. IF your injection contained steroids your blood sugar levels may become higher than normal.    If you are still having pain upon discharge:  Your pain may improve over the next 48 hours. The anesthetic (numbing medication) works immediately to 48 hours. IF your injection contained a steroid (anti-inflammatory medication), it takes approximately 3 days to start feeling relief and 7-10 days to see your greatest results from the medication. It is possible you may need subsequent injections. This would be discussed at your follow up appointment with pain management or your referring doctor.      PLEASE CALL YOUR DOCTOR IF:  1. Redness or swelling around the injection site.  2. Fever of 101 degrees or more  3. Drainage (pus) from the injection site.  4. For any continuous bleeding (some dried blood over the incision is normal.)    FOR EMERGENCIES:   If any unusual problems or difficulties occur during clinic hours, call [phone] or [phone].

## 2019-02-08 ENCOUNTER — OFFICE VISIT (OUTPATIENT)
Dept: PAIN MEDICINE | Facility: CLINIC | Age: 84
End: 2019-02-08
Payer: MEDICARE

## 2019-02-08 VITALS
HEART RATE: 67 BPM | WEIGHT: 179 LBS | BODY MASS INDEX: 25.06 KG/M2 | SYSTOLIC BLOOD PRESSURE: 111 MMHG | TEMPERATURE: 98 F | HEIGHT: 71 IN | DIASTOLIC BLOOD PRESSURE: 60 MMHG

## 2019-02-08 DIAGNOSIS — M19.011 PRIMARY OSTEOARTHRITIS OF BOTH SHOULDERS: ICD-10-CM

## 2019-02-08 DIAGNOSIS — S32.030A CLOSED COMPRESSION FRACTURE OF THIRD LUMBAR VERTEBRA, INITIAL ENCOUNTER: ICD-10-CM

## 2019-02-08 DIAGNOSIS — M47.816 LUMBAR SPONDYLOSIS: Primary | ICD-10-CM

## 2019-02-08 DIAGNOSIS — M47.819 OSTEOARTHRITIS OF SPINE WITHOUT MYELOPATHY OR RADICULOPATHY, UNSPECIFIED SPINAL REGION: ICD-10-CM

## 2019-02-08 DIAGNOSIS — M19.012 PRIMARY OSTEOARTHRITIS OF BOTH SHOULDERS: ICD-10-CM

## 2019-02-08 PROCEDURE — 99999 PR PBB SHADOW E&M-EST. PATIENT-LVL III: ICD-10-PCS | Mod: PBBFAC,HCNC,, | Performed by: NURSE PRACTITIONER

## 2019-02-08 PROCEDURE — 99213 OFFICE O/P EST LOW 20 MIN: CPT | Mod: HCNC,S$GLB,, | Performed by: NURSE PRACTITIONER

## 2019-02-08 PROCEDURE — 99999 PR PBB SHADOW E&M-EST. PATIENT-LVL III: CPT | Mod: PBBFAC,HCNC,, | Performed by: NURSE PRACTITIONER

## 2019-02-08 PROCEDURE — 1101F PR PT FALLS ASSESS DOC 0-1 FALLS W/OUT INJ PAST YR: ICD-10-PCS | Mod: HCNC,CPTII,S$GLB, | Performed by: NURSE PRACTITIONER

## 2019-02-08 PROCEDURE — 1101F PT FALLS ASSESS-DOCD LE1/YR: CPT | Mod: HCNC,CPTII,S$GLB, | Performed by: NURSE PRACTITIONER

## 2019-02-08 PROCEDURE — 99213 PR OFFICE/OUTPT VISIT, EST, LEVL III, 20-29 MIN: ICD-10-PCS | Mod: HCNC,S$GLB,, | Performed by: NURSE PRACTITIONER

## 2019-02-08 NOTE — PROGRESS NOTES
Subjective:     Patient ID: Negro Bhakta is a 85 y.o. male.    Chief Complaint: Pain    Consulted by: Darwin Byrne MD     Disclaimer: This note was generated using voice recognition software.  There may be a typographical errors that were missed during proofreading.      HPI:    Negro Bhakta is a 85 y.o. male who presents today with bilateral shoulder pain, bilateral hand pain, and neck pain. 3 month history of bilateral shoulder pain, is  8/10 achy pain, worse at night, worse with overhead activity. Bilateral hand pain 5/10, with numbness, tingling, weakness and getting progressively worse over the last several years. Neck pain is 3/10 achy pain, with cracking/popping with rotation and sidebending- does not radiate into UE. This pain is described in detail below.    Interval History (11/28/2018):  The patient returns to discuss lower back pain.  The pain starts across the lower back without radiation.  It is worse with sitting and standing.  It is worse in the morning.  He had XRAYs today as ordered by his PCP.  Results show DJD, DDD and L3 compression fracture.  He reports that he is a retired [employer]  and has been active.  He has not had an MRI.  He has not had injections for his back.  His knee pain has been tolerable.  He has a history of bilateral TKA by Dr. [facility] in the past.  His pain today is 9/10.    Interval History (2/8/2019):  The patient presents for procedure follow up.  He is s/p lumbar RFAs with 80% relief.  His back pain is now very mild.  He did have lumbar MRI which also shows probably L3 fracture.  He does not want to pursue treatment for this.  His pain today is 3/10.    Physical Therapy: in the past    Non-pharmacologic Treatment: none         · TENS? no    Pain Medications:         · Currently taking: tylenol    · Has tried in the past:  advil    · Has not tried: anything else    Blood thinners: none    Interventional Therapies:   Shoulder  injections- helpful    Relevant Surgeries: bilateral TKA- Dr. [facility]    Affecting sleep? yes    Affecting daily activities? yes    Depressive symptoms? no          · SI/HI? No    Work status: works on his land cultivating crops    Pain Scales  Best:/10  Worst:  Usually:  Today: 3/10    Low-back Pain   This is a chronic problem. The current episode started more than 1 month ago. The problem occurs constantly. The problem has been gradually worsening since onset. The pain is present in the lumbar spine. The pain does not radiate. The quality of the pain is described as burning. The pain is at a severity of 8/10. The pain is mild. The pain is the same all the time. The symptoms are aggravated by lying down. Stiffness is present at night and all day. He has tried NSAIDs and bed rest for the symptoms. The treatment provided no relief. Physical therapy was not tried.      Last 3 PDI Scores 2/8/2019 11/28/2018 2/3/2017   Pain Disability Index (PDI) 21 28 24       Review of Systems   Musculoskeletal: Positive for back pain.   All other systems reviewed and are negative.            Past Medical History:   Diagnosis Date    Allergy     Arthritis     Baker's cyst     BPH (benign prostatic hypertrophy)     Calcium oxalate renal stones     Cataract     Elevated PSA     Glaucoma        Past Surgical History:   Procedure Laterality Date    ADENOIDECTOMY      ARTHROPLASTY, KNEE, TOTAL Left 7/10/2013    [facility] OR 2ND FLR    ARTHROPLASTY, KNEE, TOTAL Right 3/13/2013    [facility] OR 2ND FLR    Block, Nerve MEDIAL BRANCH BLOCKS BILATERAL LUMBAR L2,3,4,5 UNDER FLUORO Bilateral 11/30/2018    [facility] PAIN MGT    COLONOSCOPY N/A 4/9/2018    Per[facility]IR[facility] Hopper [facility] at [facility] ENDO (4TH FLR)    COLONOSCOPY N/A 4/8/2014    [facility] by Ambrosio [facility]eng [facility] at [facility] ENDO (4TH FLR)    EXTRACORPOREAL SHOCK WAVE  LITHOTRIPSY      JOINT REPLACEMENT Bilateral     Radiofrequency Ablation LEFT L2,3,4,5 RFA WITH STUART VENOM Left 2019    [facility]    Radiofrequency Ablation RIGHT L2,3,4,5 RFA WITH STUART VENOM Right 2019    [facility]    TONSILLECTOMY      TOTAL KNEE ARTHROPLASTY  7/10/2013    Left    TOTAL KNEE ARTHROPLASTY  3/13/2013    Right       Review of patient's allergies indicates:   Allergen Reactions    Amoxicillin Other (See Comments)     Blisters to hands       Current Outpatient Medications   Medication Sig Dispense Refill    latanoprost 0.005 % ophthalmic solution Place 1 drop into the right eye every evening. 7.5 mL 5    multivitamin (ONE DAILY MULTIVITAMIN) per tablet Take 1 tablet by mouth once daily.       No current facility-administered medications for this visit.        Family History   Problem Relation Age of Onset    Cataracts Father     Cancer Sister         Retinal cancer    Esophageal cancer Mother          at 49 years and in the year     Cancer Mother         stomach    No Known Problems Brother     No Known Problems Daughter     No Known Problems Son     No Known Problems Son     No Known Problems Son     No Known Problems Son     No Known Problems Son     No Known Problems Maternal Aunt     No Known Problems Maternal Uncle     No Known Problems Paternal Aunt     No Known Problems Paternal Uncle     No Known Problems Maternal Grandmother     No Known Problems Maternal Grandfather     No Known Problems Paternal Grandmother     No Known Problems Paternal Grandfather     Amblyopia Neg Hx     Blindness Neg Hx     Diabetes Neg Hx     Glaucoma Neg Hx     Hypertension Neg Hx     Macular degeneration Neg Hx     Retinal detachment Neg Hx     Strabismus Neg Hx     Stroke Neg Hx     Thyroid disease Neg Hx        Social History     Socioeconomic History    Marital status:  "     Spouse name: Delmis    Number of children: Not on file    Years of education: Not on file    Highest education level: Not on file   Social Needs    Financial resource strain: Not on file    Food insecurity - worry: Not on file    Food insecurity - inability: Not on file    Transportation needs - medical: Not on file    Transportation needs - non-medical: Not on file   Occupational History    Not on file   Tobacco Use    Smoking status: Former Smoker     Last attempt to quit: 1975     Years since quittin.2    Smokeless tobacco: Never Used   Substance and Sexual Activity    Alcohol use: Yes     Alcohol/week: 0.6 oz     Types: 1 Cans of beer per week    Drug use: No    Sexual activity: Yes     Partners: Female   Other Topics Concern    Not on file   Social History Narrative    Not on file       Objective:     Vitals:    19 0933   BP: 111/60   Pulse: 67   Temp: 98.1 °F (36.7 °C)   Weight: 81.2 kg (179 lb 0.2 oz)   Height: 5' 11" (1.803 m)   PainSc:   3       GEN:  Well developed, well nourished.  No acute distress.  No pain behavior.  HEENT:  No trauma.  Mucous membranes moist.  Nares patent bilaterally.  PSYCH: Normal affect. Thought content appropriate.  CHEST:  Breathing symmetric.  No audible wheezing.  ABD: Soft, non-tender, non-distended.  SKIN:  Warm, pink, dry.  No rash on exposed areas.    EXT:  No cyanosis, clubbing, or edema.  No color change or changes in nail or hair growth.  NEURO/MUSCULOSKELETAL:  Fully alert, oriented, and appropriate. Speech normal eulalia. No cranial nerve deficits.   Gait: non-antalgic.  5/5 motor strength throughout upper extremities.   Sensory: Normal sensation.  Reflexes: 2+ and symmetric throughout.  negative Gallegos's bilaterally.  L-Spine:  Decreased ROM with pain on lateral rotation. Mild facet loading bilaterally.  negative SLR bilaterally.    No TTP over lumbar facet joints and bilateral SI joints.  MANUELITO negative.      Imaging:    "   Narrative     EXAMINATION:  XR LUMBAR SPINE AP AND LATERAL    CLINICAL HISTORY:  Spondylolisthesis;Low back pain, risk factors (osteoporosis or chronic steroid use or elderly);  Low back pain    FINDINGS:  There are L5 pars defects with grade 2 anterolisthesis and moderate-severe DJD.  There is moderate DJD above this level with grade 1 anterolisthesis of L4 on L5.  There is a concave compression deformity upper endplate of L3.      Impression       See above         Assessment:     Encounter Diagnoses   Name Primary?   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 Bhakta's pain is consistent with the above.    We discussed the assessment and recommendations.  All available images were reviewed. We discussed the disease process, prognosis, treatment plan, and risks and benefits. The patient is aware of the risks and benefits of the medications being prescribed, common side effects, and proper usage. The following is the plan we agreed on:     1. Lumbar MRI reviewed with patient.  He declined treatment for fracture and he is not very symptomatic for this.  2. He had significant benefit with bilateral L2,3,4,5 RFAs.  3. The patient will continue a home exercise routine to help with pain and strengthening.    4. RTC PRN.     The above plan and management options were discussed at length with patient. Patient is in agreement with the above and verbalized understanding.      Droothea Vaughn, WILLIAM  02/08/2019

## 2019-06-05 ENCOUNTER — PES CALL (OUTPATIENT)
Dept: ADMINISTRATIVE | Facility: CLINIC | Age: 84
End: 2019-06-05

## 2019-07-11 ENCOUNTER — PES CALL (OUTPATIENT)
Dept: ADMINISTRATIVE | Facility: CLINIC | Age: 84
End: 2019-07-11

## 2019-09-12 ENCOUNTER — CLINICAL SUPPORT (OUTPATIENT)
Dept: OPHTHALMOLOGY | Facility: CLINIC | Age: 84
End: 2019-09-12
Payer: MEDICARE

## 2019-09-12 ENCOUNTER — OFFICE VISIT (OUTPATIENT)
Dept: OPTOMETRY | Facility: CLINIC | Age: 84
End: 2019-09-12
Payer: MEDICARE

## 2019-09-12 DIAGNOSIS — H26.9 CORTICAL CATARACT OF BOTH EYES: ICD-10-CM

## 2019-09-12 DIAGNOSIS — H25.13 NUCLEAR SCLEROSIS, BILATERAL: ICD-10-CM

## 2019-09-12 DIAGNOSIS — H53.40 VISUAL FIELD DEFECT: ICD-10-CM

## 2019-09-12 DIAGNOSIS — H40.051 OCULAR HYPERTENSION OF RIGHT EYE: Primary | ICD-10-CM

## 2019-09-12 DIAGNOSIS — H40.053 OCULAR HYPERTENSION, BILATERAL: ICD-10-CM

## 2019-09-12 DIAGNOSIS — H40.1111 PRIMARY OPEN ANGLE GLAUCOMA (POAG) OF RIGHT EYE, MILD STAGE: ICD-10-CM

## 2019-09-12 PROCEDURE — 99999 PR PBB SHADOW E&M-EST. PATIENT-LVL III: ICD-10-PCS | Mod: PBBFAC,HCNC,, | Performed by: OPTOMETRIST

## 2019-09-12 PROCEDURE — 99999 PR PBB SHADOW E&M-EST. PATIENT-LVL III: CPT | Mod: PBBFAC,HCNC,, | Performed by: OPTOMETRIST

## 2019-09-12 PROCEDURE — 92083 EXTENDED VISUAL FIELD XM: CPT | Mod: HCNC,S$GLB,, | Performed by: OPTOMETRIST

## 2019-09-12 PROCEDURE — 92014 COMPRE OPH EXAM EST PT 1/>: CPT | Mod: HCNC,S$GLB,, | Performed by: OPTOMETRIST

## 2019-09-12 PROCEDURE — 92014 PR EYE EXAM, EST PATIENT,COMPREHESV: ICD-10-PCS | Mod: HCNC,S$GLB,, | Performed by: OPTOMETRIST

## 2019-09-12 PROCEDURE — 92083 HUMPHREY VISUAL FIELD - OU - BOTH EYES: ICD-10-PCS | Mod: HCNC,S$GLB,, | Performed by: OPTOMETRIST

## 2019-09-12 RX ORDER — CLINDAMYCIN HYDROCHLORIDE 300 MG/1
CAPSULE ORAL
Refills: 1 | Status: ON HOLD | COMMUNITY
Start: 2019-08-13 | End: 2022-06-07 | Stop reason: HOSPADM

## 2019-09-12 NOTE — PROGRESS NOTES
HPI     6 mos      Additional comments: Six month check-up.    Repeat HVF test (24-2) done today.   patient states vision is stable.  Wears glasses full-time.               Comments     Patient's age: 86 y.o. WM  Occupation: Retired  Approximate date of last eye examination:  01/07/2019  Name of last eye doctor seen: Dr. Riggins  City/State: [facility]  Wears glasses? Yes     If yes, wears  Full-time or part-time?  Full-Time  Present glasses are: Bifocal, SV Distance, SV Reading?  Lined Bifocal  Approximate age of present glasses:  1 yr   Got new glasses following last exam, or subsequently?:  Yes    Any problem with VA with glasses?  No complaints   Wears CLs?:  No  Headaches?  No  Eye pain/discomfort?  No                                                                                     Flashes?  No  Floaters?  No  Diplopia/Double vision?  No  Patient's Ocular History:         Any eye surgeries? No         Any eye injury?  No         Any treatment for eye disease?  Ocular Hypertension - using   Latanoprost 0.005% ophthalmic solution every evening in OD only.   Family history of eye disease?  Sister with eye cancer and enucleation  Significant patient medical history:         1. Diabetes?  No   2. HBP?  No                  ! OTC eyedrops currently using:  No   ! Prescription eye meds currently using:  Latanoprost QHS OD Only    ! Any history of allergy/adverse reaction to any eye meds used   previously?  No   ! Any history of allergy/adverse reaction to eyedrops used during prior   eye exam(s)? No   ! Any history of allergy/adverse reaction to Novacaine or similar meds?   No   ! Any history of allergy/adverse reaction to Epinephrine or similar meds?   No    ! Patient okay with use of anesthetic eyedrops to check eye pressure?    Yes       ! Patient okay with use of eyedrops to dilate pupils today?  Yes   !  Allergies/Medications/Medical History/Family History reviewed today?    Yes      PD =     Desired reading  distance =                                                                            Last edited by Yair Riggins, OD on 9/12/2019 10:30 AM. (History)            Assessment /Plan     For exam results, see Encounter Report.    1. Ocular hypertension of right eye  Cruz Visual Field - OU - Extended - Both Eyes   2. Primary open angle glaucoma (POAG) of right eye, mild stage  Cruz Visual Field - OU - Extended - Both Eyes   3. Nuclear sclerosis, bilateral     4. Cortical cataract of both eyes                      Bilateral nuclear sclerotic/cortical cataract.  No need for cataract surgery in either eye at this time.  Continue to monitor.  VA wth glasses comparable to the best corrected VA achieved with last refraction in each eye.    No change made to the last spectacle lens Rx issued.      Prior diagnosis of ocular hypertension in the right eye, with visual field changes suggestive of early inferior acuate defect in the right eye on last HVF test done.   Currently using Latanoprost 0.005% ophthalmic solution in the right eye (only) every evening for IOP control/reduction.  IOP today normal in the right eye (with drops), and high-normal  in the left eye (without drops)   Repeat HVF test (24-2 EUNICE Standard) done today.  Early inferior arcuate defect in the right eye consistent with early/mild stable glaucoma  No evidence of glaucomatous visual field defect in the left eye.       Return in six months for recheck, with repeat HVF test (24-2 EUNICE Standard) in six months.   In the interim, continue Latanoprost 0.005% ophthalmic solution every evening in the right eye only.  Orders for future HVF test entered.

## 2019-09-12 NOTE — PATIENT INSTRUCTIONS
Bilateral nuclear sclerotic/cortical cataract.  No need for cataract surgery in either eye at this time.  Continue to monitor.  VA wth glasses comparable to the best corrected VA achieved with last refraction in each eye.    No change made to the last spectacle lens Rx issued.      Prior diagnosis of ocular hypertension in the right eye, with visual field changes suggestive of early inferior acuate defect in the right eye on last HVF test done.   Currently using Latanoprost 0.005% ophthalmic solution in the right eye (only) every evening for IOP control/reduction.  IOP today normal in the right eye (with drops), and high-normal  in the left eye (without drops)   Repeat HVF test (24-2 EUNICE Standard) done today.  Early inferior arcuate defect in the right eye consistent with early/mild stable glaucoma  No evidence of glaucomatous visual field defect in the left eye.       Return in six months for recheck, with repeat HVF test (24-2 EUNICE Standard) in six months.   In the interim, continue Latanoprost 0.005% ophthalmic solution every evening in the right eye only.  Orders for future HVF test entered.

## 2019-11-13 ENCOUNTER — TELEPHONE (OUTPATIENT)
Dept: INTERNAL MEDICINE | Facility: CLINIC | Age: 84
End: 2019-11-13

## 2019-11-13 DIAGNOSIS — R79.9 ABNORMAL FINDING OF BLOOD CHEMISTRY, UNSPECIFIED: ICD-10-CM

## 2019-11-13 DIAGNOSIS — N40.0 BENIGN PROSTATIC HYPERPLASIA WITHOUT LOWER URINARY TRACT SYMPTOMS: Primary | ICD-10-CM

## 2019-11-13 NOTE — TELEPHONE ENCOUNTER
----- Message from Nat Coker sent at 11/13/2019  2:15 PM CST -----  Contact: [phone]  Type: Orders Request    What orders/ testing are being requested? Routine labs     Is there a future appointment scheduled for the patient with PCP? Yes     When? 11-    Would you prefer a response via [employer]? Please call to schedule labs  at [facility]    Comments:  Please advise, thank you.

## 2019-11-14 ENCOUNTER — PATIENT OUTREACH (OUTPATIENT)
Dept: ADMINISTRATIVE | Facility: HOSPITAL | Age: 84
End: 2019-11-14

## 2019-11-18 ENCOUNTER — LAB VISIT (OUTPATIENT)
Dept: LAB | Facility: HOSPITAL | Age: 84
End: 2019-11-18
Attending: INTERNAL MEDICINE
Payer: MEDICARE

## 2019-11-18 DIAGNOSIS — N40.0 BENIGN PROSTATIC HYPERPLASIA WITHOUT LOWER URINARY TRACT SYMPTOMS: ICD-10-CM

## 2019-11-18 DIAGNOSIS — R79.9 ABNORMAL FINDING OF BLOOD CHEMISTRY, UNSPECIFIED: ICD-10-CM

## 2019-11-18 LAB
ALBUMIN SERPL BCP-MCNC: 3.7 G/DL (ref 3.5–5.2)
ALP SERPL-CCNC: 73 U/L (ref 55–135)
ALT SERPL W/O P-5'-P-CCNC: 21 U/L (ref 10–44)
ANION GAP SERPL CALC-SCNC: 7 MMOL/L (ref 8–16)
AST SERPL-CCNC: 28 U/L (ref 10–40)
BILIRUB SERPL-MCNC: 0.8 MG/DL (ref 0.1–1)
BUN SERPL-MCNC: 20 MG/DL (ref 8–23)
CALCIUM SERPL-MCNC: 9.7 MG/DL (ref 8.7–10.5)
CHLORIDE SERPL-SCNC: 106 MMOL/L (ref 95–110)
CHOLEST SERPL-MCNC: 133 MG/DL (ref 120–199)
CHOLEST/HDLC SERPL: 2.7 {RATIO} (ref 2–5)
CO2 SERPL-SCNC: 26 MMOL/L (ref 23–29)
CREAT SERPL-MCNC: 1 MG/DL (ref 0.5–1.4)
EST. GFR  (AFRICAN AMERICAN): >60 ML/MIN/1.73 M^2
EST. GFR  (NON AFRICAN AMERICAN): >60 ML/MIN/1.73 M^2
GLUCOSE SERPL-MCNC: 100 MG/DL (ref 70–110)
HDLC SERPL-MCNC: 50 MG/DL (ref 40–75)
HDLC SERPL: 37.6 % (ref 20–50)
LDLC SERPL CALC-MCNC: 69.8 MG/DL (ref 63–159)
NONHDLC SERPL-MCNC: 83 MG/DL
POTASSIUM SERPL-SCNC: 4.7 MMOL/L (ref 3.5–5.1)
PROT SERPL-MCNC: 6.8 G/DL (ref 6–8.4)
SODIUM SERPL-SCNC: 139 MMOL/L (ref 136–145)
TRIGL SERPL-MCNC: 66 MG/DL (ref 30–150)

## 2019-11-18 PROCEDURE — 80061 LIPID PANEL: CPT | Mod: HCNC

## 2019-11-18 PROCEDURE — 36415 COLL VENOUS BLD VENIPUNCTURE: CPT | Mod: HCNC

## 2019-11-18 PROCEDURE — 80053 COMPREHEN METABOLIC PANEL: CPT | Mod: HCNC

## 2021-12-09 ENCOUNTER — TELEPHONE (OUTPATIENT)
Dept: OPHTHALMOLOGY | Facility: CLINIC | Age: 86
End: 2021-12-09
Payer: MEDICARE

## 2022-06-03 PROBLEM — R00.1 SYMPTOMATIC BRADYCARDIA: Status: ACTIVE | Noted: 2022-06-03

## 2022-09-22 NOTE — PLAN OF CARE
received a call from [facility] manager, Getachew. Getachew stated this patient is on the \"refer to management\" list because in 2019 pt was in his room, Getachew stated he was going to enter his room several times. When Getachew opened the door, pt swung a \"Wizard Staff\" (a long rounded stick). Pt was selectively mute while he was there.  Getachew stated the arrest warrant was for property destruction and that although it could maybe be moved to a court hearing and not arrest warrant, pt is not someone they would take on a Friday due to staffing on the weekends.  Getachew stated he would come see patient on Friday 9/23/2022 to evaluate.    Writer will call PO supervisor regarding future placement with PO involvement.  Left  for KATHIE Lorenzana sueprvisolisha.    Izabelar received a call back from  Rowena, phone: [phone]-she stated she would come see patient on Monday and would work on some calls tomorrow on options or mental health/legal support.    Marlyn Salinas, JESIKA - Discharge Specialist, [address] 9/22/2022   PATIENT TOLERATED PROCEDURE WELL. PT COMPLAINS OF  0/10 PAIN. ASSISTED PATIENT UP FOR FIRST TIME. STEADY ON FEET AND DISCHARGE INSTRUCTIONS GIVEN.

## (undated) DEVICE — DRESSING LEUKOPLAST FLEX 1X3IN